# Patient Record
Sex: FEMALE | Race: WHITE | Employment: FULL TIME | URBAN - METROPOLITAN AREA
[De-identification: names, ages, dates, MRNs, and addresses within clinical notes are randomized per-mention and may not be internally consistent; named-entity substitution may affect disease eponyms.]

---

## 2018-09-24 ENCOUNTER — HOSPITAL ENCOUNTER (OUTPATIENT)
Dept: GENERAL RADIOLOGY | Age: 68
Discharge: HOME | End: 2018-09-26
Payer: MEDICARE

## 2018-09-24 DIAGNOSIS — Z12.31 ENCOUNTER FOR SCREENING MAMMOGRAM FOR HIGH-RISK PATIENT: ICD-10-CM

## 2018-09-24 PROCEDURE — 77063 BREAST TOMOSYNTHESIS BI: CPT

## 2019-03-05 ENCOUNTER — HOSPITAL ENCOUNTER (OUTPATIENT)
Dept: GENERAL RADIOLOGY | Age: 69
Discharge: HOME | End: 2019-03-07
Payer: MEDICARE

## 2019-03-05 ENCOUNTER — HOSPITAL ENCOUNTER (OUTPATIENT)
Age: 69
Discharge: HOME | End: 2019-03-07
Payer: MEDICARE

## 2019-03-05 DIAGNOSIS — M25.551 RIGHT HIP PAIN: ICD-10-CM

## 2019-03-05 PROCEDURE — 72170 X-RAY EXAM OF PELVIS: CPT

## 2019-03-05 PROCEDURE — 73502 X-RAY EXAM HIP UNI 2-3 VIEWS: CPT

## 2019-05-03 ENCOUNTER — HOSPITAL ENCOUNTER (OUTPATIENT)
Age: 69
Discharge: HOME | End: 2019-05-05
Payer: MEDICARE

## 2019-05-03 ENCOUNTER — HOSPITAL ENCOUNTER (OUTPATIENT)
Dept: ULTRASOUND IMAGING | Age: 69
Discharge: HOME | End: 2019-05-05
Payer: MEDICARE

## 2019-05-03 DIAGNOSIS — R10.11 ABDOMINAL PAIN, RIGHT UPPER QUADRANT: ICD-10-CM

## 2019-05-03 PROCEDURE — 76700 US EXAM ABDOM COMPLETE: CPT

## 2019-11-11 ENCOUNTER — HOSPITAL ENCOUNTER (OUTPATIENT)
Dept: GENERAL RADIOLOGY | Age: 69
Discharge: HOME | End: 2019-11-13
Payer: MEDICARE

## 2019-11-11 DIAGNOSIS — Z12.31 VISIT FOR SCREENING MAMMOGRAM: ICD-10-CM

## 2019-11-11 PROCEDURE — 77063 BREAST TOMOSYNTHESIS BI: CPT

## 2019-12-02 ENCOUNTER — HOSPITAL ENCOUNTER (OUTPATIENT)
Age: 69
Discharge: HOME | End: 2019-12-04
Payer: MEDICARE

## 2019-12-02 ENCOUNTER — HOSPITAL ENCOUNTER (OUTPATIENT)
Dept: GENERAL RADIOLOGY | Age: 69
Discharge: HOME | End: 2019-12-04
Payer: MEDICARE

## 2019-12-02 DIAGNOSIS — R07.89 OTHER CHEST PAIN: ICD-10-CM

## 2019-12-02 PROCEDURE — 71101 X-RAY EXAM UNILAT RIBS/CHEST: CPT

## 2020-12-07 ENCOUNTER — HOSPITAL ENCOUNTER (OUTPATIENT)
Dept: GENERAL RADIOLOGY | Age: 70
Discharge: HOME | End: 2020-12-09
Payer: MEDICARE

## 2020-12-07 PROCEDURE — 77063 BREAST TOMOSYNTHESIS BI: CPT

## 2021-03-22 ENCOUNTER — OFFICE VISIT (OUTPATIENT)
Dept: PODIATRY | Age: 71
End: 2021-03-22
Payer: MEDICARE

## 2021-03-22 VITALS — HEIGHT: 62 IN | WEIGHT: 121 LBS | BODY MASS INDEX: 22.26 KG/M2

## 2021-03-22 DIAGNOSIS — B35.1 ONYCHOMYCOSIS: ICD-10-CM

## 2021-03-22 DIAGNOSIS — G57.91 NERVE ENTRAPMENT SYNDROME OF RIGHT FOOT: ICD-10-CM

## 2021-03-22 DIAGNOSIS — M79.671 PAIN OF RIGHT FOOT: ICD-10-CM

## 2021-03-22 DIAGNOSIS — M89.8X7 EXOSTOSIS OF RIGHT FOOT: Primary | ICD-10-CM

## 2021-03-22 DIAGNOSIS — M79.671 RIGHT FOOT PAIN: Primary | ICD-10-CM

## 2021-03-22 DIAGNOSIS — R26.2 DIFFICULTY WALKING: ICD-10-CM

## 2021-03-22 PROCEDURE — 4004F PT TOBACCO SCREEN RCVD TLK: CPT | Performed by: PODIATRIST

## 2021-03-22 PROCEDURE — 4040F PNEUMOC VAC/ADMIN/RCVD: CPT | Performed by: PODIATRIST

## 2021-03-22 PROCEDURE — 1123F ACP DISCUSS/DSCN MKR DOCD: CPT | Performed by: PODIATRIST

## 2021-03-22 PROCEDURE — G8484 FLU IMMUNIZE NO ADMIN: HCPCS | Performed by: PODIATRIST

## 2021-03-22 PROCEDURE — G8427 DOCREV CUR MEDS BY ELIG CLIN: HCPCS | Performed by: PODIATRIST

## 2021-03-22 PROCEDURE — 3017F COLORECTAL CA SCREEN DOC REV: CPT | Performed by: PODIATRIST

## 2021-03-22 PROCEDURE — G8400 PT W/DXA NO RESULTS DOC: HCPCS | Performed by: PODIATRIST

## 2021-03-22 PROCEDURE — 1090F PRES/ABSN URINE INCON ASSESS: CPT | Performed by: PODIATRIST

## 2021-03-22 PROCEDURE — 99203 OFFICE O/P NEW LOW 30 MIN: CPT | Performed by: PODIATRIST

## 2021-03-22 PROCEDURE — G8420 CALC BMI NORM PARAMETERS: HCPCS | Performed by: PODIATRIST

## 2021-03-22 RX ORDER — HYDROCHLOROTHIAZIDE 25 MG/1
TABLET ORAL
COMMUNITY
Start: 2020-12-21

## 2021-03-22 RX ORDER — LISINOPRIL 20 MG/1
TABLET ORAL
COMMUNITY
Start: 2021-01-18

## 2021-03-22 RX ORDER — CELECOXIB 200 MG/1
200 CAPSULE ORAL 2 TIMES DAILY
COMMUNITY

## 2021-03-22 NOTE — PROGRESS NOTES
Patient is here as a new patient and would like to get established. Patient is here for arthritis in the right foot. Left foot callous. Patient has think bilateral nails and would like to filed down. Patient would like to know about cortisone injections.

## 2021-03-22 NOTE — PROGRESS NOTES
tablet, Take 7.5 mg by mouth 2 times daily. , Disp: , Rfl:     Allergies: Allergies   Allergen Reactions    Fish-Derived Products     Indomethacin        Vitals:    03/22/21 0918   Weight: 121 lb (54.9 kg)   Height: 5' 2\" (1.575 m)        Past Medical History:   Diagnosis Date    Cancer     breast right     No family history on file. Past Surgical History:   Procedure Laterality Date    LYMPHADENECTOMY      right    MASTECTOMY, PARTIAL       Social History     Tobacco Use    Smoking status: Never Smoker   Substance Use Topics    Alcohol use: No    Drug use: No           Diagnostic studies:    Xr Foot Right (min 3 Views)    Result Date: 3/22/2021  EXAMINATION: THREE XRAY VIEWS OF THE RIGHT FOOT 3/22/2021 8:29 am COMPARISON: None. HISTORY: ORDERING SYSTEM PROVIDED HISTORY: Right foot pain TECHNOLOGIST PROVIDED HISTORY: Reason for exam:->right foot pain FINDINGS: There is marked loss/flattening of the mid foot arch. There is marked joint space narrowing, degenerative remodeling, and osteophyte formation of the the intertarsal, 2nd through 5th MTT, and 1st MTP joints. An enthesophyte arises at the Achilles insertion. A spur projects from the plantar calcaneus. No acute abnormalities. Heel spur and polyarticular osteoarthritis. Procedures:    None    Exam:  VASCULAR: Pedal pulses palpable right foot. Capillary fill time less than 5 seconds digits 1 through 5 right foot. NEUROLOGICAL: Epicritic sensations intact right foot. Paresthesias noted upon percussion along the deep peroneal nerve right midfoot region. DERMATOLOGICAL: Mild edematous issues noted into the dorsal right midfoot region without ecchymotic skin changes present. No skin abrasions or any signs of infection noted right foot. Dystrophy was also noted to multiple digital nails bilateral foot.   MUSCULOSKELETAL: Tenderness noted with active range of motion right ankle and subtalar joint along the dorsal extensor tendon complex. Plan Per Assessment  Lizbeth Paredes was seen today for foot pain and callouses. Diagnoses and all orders for this visit:    Exostosis of right foot    Nerve entrapment syndrome of right foot    Onychomycosis  -     ciclopirox (PENLAC) 8 % solution; Apply topically daily to affected nails. Pain of right foot    Difficulty walking        1. New patient evaluation and management  2. We did review x-ray studies which were ordered today with patient right foot. No acute abnormalities noted. Significant osteoarthritic changes noted dorsal naviculocuneiform region. Prescription medication Penlac given with exact instructions on usage. I discussed the potential side effects that the patient may experience with the medication and the need to call if they experience any. We did discuss continued conservative care options regarding the right foot issues as well as potential surgical intervention. Patient will be followed up at a later date for continued evaluation and management. She was advised to call the office with any questions or concerns in the interim. Seen By:    Edilberto Wing DPM    Electronically signed by Edilberto Wing DPM on 3/22/2021 at 4:42 PM      This note was created using voice recognition software. The note was reviewed however may contain grammatical errors.

## 2021-11-22 ENCOUNTER — HOSPITAL ENCOUNTER (OUTPATIENT)
Dept: GENERAL RADIOLOGY | Age: 71
Discharge: HOME | End: 2021-11-24
Payer: MEDICARE

## 2021-11-22 ENCOUNTER — HOSPITAL ENCOUNTER (OUTPATIENT)
Age: 71
Discharge: HOME | End: 2021-11-24
Payer: MEDICARE

## 2021-11-22 DIAGNOSIS — M25.512 ACUTE PAIN OF LEFT SHOULDER: ICD-10-CM

## 2021-11-22 PROCEDURE — 73030 X-RAY EXAM OF SHOULDER: CPT

## 2022-07-25 ENCOUNTER — OFFICE VISIT (OUTPATIENT)
Dept: PODIATRY | Age: 72
End: 2022-07-25
Payer: MEDICARE

## 2022-07-25 VITALS — BODY MASS INDEX: 22.26 KG/M2 | WEIGHT: 121 LBS | HEIGHT: 62 IN

## 2022-07-25 DIAGNOSIS — B35.1 ONYCHOMYCOSIS: Primary | ICD-10-CM

## 2022-07-25 DIAGNOSIS — L60.0 OC (ONYCHOCRYPTOSIS): ICD-10-CM

## 2022-07-25 DIAGNOSIS — L85.3 XEROSIS CUTIS: ICD-10-CM

## 2022-07-25 PROCEDURE — G8420 CALC BMI NORM PARAMETERS: HCPCS | Performed by: PODIATRIST

## 2022-07-25 PROCEDURE — G8400 PT W/DXA NO RESULTS DOC: HCPCS | Performed by: PODIATRIST

## 2022-07-25 PROCEDURE — G8427 DOCREV CUR MEDS BY ELIG CLIN: HCPCS | Performed by: PODIATRIST

## 2022-07-25 PROCEDURE — 99213 OFFICE O/P EST LOW 20 MIN: CPT | Performed by: PODIATRIST

## 2022-07-25 PROCEDURE — 1123F ACP DISCUSS/DSCN MKR DOCD: CPT | Performed by: PODIATRIST

## 2022-07-25 PROCEDURE — 4004F PT TOBACCO SCREEN RCVD TLK: CPT | Performed by: PODIATRIST

## 2022-07-25 PROCEDURE — 1090F PRES/ABSN URINE INCON ASSESS: CPT | Performed by: PODIATRIST

## 2022-07-25 PROCEDURE — 3017F COLORECTAL CA SCREEN DOC REV: CPT | Performed by: PODIATRIST

## 2022-07-25 RX ORDER — AMMONIUM LACTATE 12 G/100G
CREAM TOPICAL
Qty: 385 G | Refills: 4 | Status: SHIPPED | OUTPATIENT
Start: 2022-07-25

## 2022-07-25 NOTE — PROGRESS NOTES
22     Debby Velazquez    : 1950   Sex: female    Age: 70 y.o. Patient's PCP/Provider is:  Dee Duran DO    Subjective:  Patient is seen today for follow-up regarding continued evaluation regarding nail dystrophy issues ingrown issues multiple nails bilateral foot. Also having issues with chronic xerotic skin changes to the plantar forefoot regions bilaterally. No other additional abnormalities noted at this time. Chief Complaint   Patient presents with    Nail Problem       ROS:  Const: Positives and pertinent negatives as per HPI. Musculo: Denies symptoms other than stated above. Neuro: Denies symptoms other than stated above. Skin: Denies symptoms other than stated above. Current Medications:    Current Outpatient Medications:     ciclopirox (PENLAC) 8 % solution, Apply topically daily to affected nails. , Disp: 6 mL, Rfl: 2    ammonium lactate (LAC-HYDRIN) 12 % cream, Apply topically as needed. , Disp: 385 g, Rfl: 4    hydroCHLOROthiazide (HYDRODIURIL) 25 MG tablet, TAKE 1 TABLET BY MOUTH EVERY DAY, Disp: , Rfl:     lisinopril (PRINIVIL;ZESTRIL) 20 MG tablet, TAKE 1 TABLET BY MOUTH EVERY DAY, Disp: , Rfl:     celecoxib (CELEBREX) 200 MG capsule, Take 200 mg by mouth 2 times daily, Disp: , Rfl:     meloxicam (MOBIC) 7.5 MG tablet, Take 7.5 mg by mouth 2 times daily. , Disp: , Rfl:     Cholecalciferol 100 MCG (4000 UT) CAPS, Take  by mouth daily. , Disp: , Rfl:     Magnesium Chloride (MAG-SR PO), Take 1,350 mg by mouth daily. , Disp: , Rfl:     verapamil (CALAN SR) 180 MG extended release tablet, Take 90 mg by mouth nightly., Disp: , Rfl:     Allergies: Allergies   Allergen Reactions    Fish-Derived Products     Indomethacin        Vitals:    22 1543   Weight: 121 lb (54.9 kg)   Height: 5' 2\" (1.575 m)       Exam:  Neurovascular status unchanged. Ingrown and nail dystrophy changes noted multiple digital nails bilateral foot.   No signs of bacterial infection noted bilateral foot.  No maceration webspaces noted bilateral foot. Xerotic skin changes noted into the plantar forefoot regions bilaterally. Diagnostic Studies:     No results found. Procedures:    None    Plan Per Assessment  Lorraine Peña was seen today for nail problem. Diagnoses and all orders for this visit:    Onychomycosis  -     ciclopirox (PENLAC) 8 % solution; Apply topically daily to affected nails. OC (onychocryptosis)    Xerosis cutis  -     ammonium lactate (LAC-HYDRIN) 12 % cream; Apply topically as needed. Evaluation and management  Prescriptions were given for both topical Penlac solution and Lac-Hydrin lotion to be utilized as instructed to affected regions. We did recommend continued use of her good supportive shoe gear with every day activities to prevent worsening of issues. Patient will be followed up at a later date for continued evaluation and management. Seen By:    Fátima Black DPM    Electronically signed by Fátima Black DPM on 7/25/2022 at 4:06 PM    This note was created using voice recognition software. The note was reviewed however may contain grammatical errors. Speaking Coherently

## 2022-07-25 NOTE — PROGRESS NOTES
Patient is here for thick nail. Patient states right great toe had drainage.  Cris Estrada DO  Electronically signed by Naldo Lane LPN on 8/36/6777 at 9:57 PM

## 2022-10-03 ENCOUNTER — OFFICE VISIT (OUTPATIENT)
Dept: PODIATRY | Age: 72
End: 2022-10-03
Payer: MEDICARE

## 2022-10-03 VITALS — BODY MASS INDEX: 23.92 KG/M2 | WEIGHT: 130 LBS | HEIGHT: 62 IN

## 2022-10-03 DIAGNOSIS — M79.675 PAIN OF TOE OF LEFT FOOT: ICD-10-CM

## 2022-10-03 DIAGNOSIS — B35.1 ONYCHOMYCOSIS: Primary | ICD-10-CM

## 2022-10-03 DIAGNOSIS — I87.2 VENOUS INSUFFICIENCY (CHRONIC) (PERIPHERAL): ICD-10-CM

## 2022-10-03 DIAGNOSIS — M79.674 PAIN OF TOE OF RIGHT FOOT: ICD-10-CM

## 2022-10-03 PROCEDURE — 1090F PRES/ABSN URINE INCON ASSESS: CPT | Performed by: PODIATRIST

## 2022-10-03 PROCEDURE — G8400 PT W/DXA NO RESULTS DOC: HCPCS | Performed by: PODIATRIST

## 2022-10-03 PROCEDURE — 1036F TOBACCO NON-USER: CPT | Performed by: PODIATRIST

## 2022-10-03 PROCEDURE — G8420 CALC BMI NORM PARAMETERS: HCPCS | Performed by: PODIATRIST

## 2022-10-03 PROCEDURE — G8484 FLU IMMUNIZE NO ADMIN: HCPCS | Performed by: PODIATRIST

## 2022-10-03 PROCEDURE — G8427 DOCREV CUR MEDS BY ELIG CLIN: HCPCS | Performed by: PODIATRIST

## 2022-10-03 PROCEDURE — 1123F ACP DISCUSS/DSCN MKR DOCD: CPT | Performed by: PODIATRIST

## 2022-10-03 PROCEDURE — 3017F COLORECTAL CA SCREEN DOC REV: CPT | Performed by: PODIATRIST

## 2022-10-03 PROCEDURE — 99213 OFFICE O/P EST LOW 20 MIN: CPT | Performed by: PODIATRIST

## 2022-10-03 NOTE — PROGRESS NOTES
10/3/22     Shin Hawkins    : 1950   Sex: female    Age: 70 y.o. Patient's PCP/Provider is:  Milvia Dumont DO    Subjective:  Patient is seen today for follow-up regarding continued evaluation regarding mycotic nail issues and dystrophy issues to both feet. Patient stated issues have been improving with use of the topical medications prescribed. Is wearing appropriate shoe gear to prevent any excessive irritation to the digital regions bilaterally. No other additional abnormalities noted. Chief Complaint   Patient presents with    Nail Problem     Nail fungus        ROS:  Const: Positives and pertinent negatives as per HPI. Musculo: Denies symptoms other than stated above. Neuro: Denies symptoms other than stated above. Skin: Denies symptoms other than stated above. Current Medications:    Current Outpatient Medications:     ciclopirox (PENLAC) 8 % solution, Apply topically daily to affected nails. , Disp: 6 mL, Rfl: 2    ammonium lactate (LAC-HYDRIN) 12 % cream, Apply topically as needed. , Disp: 385 g, Rfl: 4    hydroCHLOROthiazide (HYDRODIURIL) 25 MG tablet, TAKE 1 TABLET BY MOUTH EVERY DAY, Disp: , Rfl:     lisinopril (PRINIVIL;ZESTRIL) 20 MG tablet, TAKE 1 TABLET BY MOUTH EVERY DAY, Disp: , Rfl:     celecoxib (CELEBREX) 200 MG capsule, Take 200 mg by mouth 2 times daily, Disp: , Rfl:     meloxicam (MOBIC) 7.5 MG tablet, Take 7.5 mg by mouth 2 times daily. , Disp: , Rfl:     Cholecalciferol 100 MCG (4000 UT) CAPS, Take  by mouth daily. , Disp: , Rfl:     Magnesium Chloride (MAG-SR PO), Take 1,350 mg by mouth daily. , Disp: , Rfl:     verapamil (CALAN SR) 180 MG extended release tablet, Take 90 mg by mouth nightly., Disp: , Rfl:     Allergies: Allergies   Allergen Reactions    Fish-Derived Products     Indomethacin        Vitals:    10/03/22 0805   Weight: 130 lb (59 kg)   Height: 5' 2\" (1.575 m)       Exam:  Neurovascular status unchanged.   Mycotic nail issues stable to both lower extremities. Maceration webspaces noted bilateral foot. No plantar calluses or skin fissuring noted bilateral foot. Diagnostic Studies:     No results found. Procedures:    None    Plan Per Assessment  Issac García was seen today for nail problem. Diagnoses and all orders for this visit:    Onychomycosis    Pain of toe of right foot    Pain of toe of left foot    Venous insufficiency (chronic) (peripheral)      Evaluation and management  We did discuss continued use of the topical treatments as previously discussed. Patient is to continue with her good supportive shoe gear with every day activities. Patient will be followed up at a later date for continued evaluation and management. Seen By:    Matthieu Bruno DPM    Electronically signed by Matthieu Bruno DPM on 10/3/2022 at 8:20 AM    This note was created using voice recognition software. The note was reviewed however may contain grammatical errors.

## 2022-12-05 ENCOUNTER — OFFICE VISIT (OUTPATIENT)
Dept: PODIATRY | Age: 72
End: 2022-12-05
Payer: MEDICARE

## 2022-12-05 VITALS — BODY MASS INDEX: 23.92 KG/M2 | HEIGHT: 62 IN | WEIGHT: 130 LBS

## 2022-12-05 DIAGNOSIS — M79.675 PAIN OF TOE OF LEFT FOOT: ICD-10-CM

## 2022-12-05 DIAGNOSIS — I87.2 VENOUS INSUFFICIENCY (CHRONIC) (PERIPHERAL): ICD-10-CM

## 2022-12-05 DIAGNOSIS — M79.674 PAIN OF TOE OF RIGHT FOOT: ICD-10-CM

## 2022-12-05 DIAGNOSIS — L85.3 XEROSIS CUTIS: ICD-10-CM

## 2022-12-05 DIAGNOSIS — B35.1 ONYCHOMYCOSIS: Primary | ICD-10-CM

## 2022-12-05 PROCEDURE — 99999 PR OFFICE/OUTPT VISIT,PROCEDURE ONLY: CPT | Performed by: PODIATRIST

## 2022-12-05 PROCEDURE — 11721 DEBRIDE NAIL 6 OR MORE: CPT | Performed by: PODIATRIST

## 2022-12-05 RX ORDER — TRIAMCINOLONE ACETONIDE 1 MG/G
CREAM TOPICAL
Qty: 90 G | Refills: 2 | Status: SHIPPED | OUTPATIENT
Start: 2022-12-05

## 2022-12-05 NOTE — PROGRESS NOTES
22     Chon Christopher    : 1950  Sex: female  Age: 67 y.o. Subjective: The patient is seen today for evaluation regarding foot evaluation and mycotic nail care. No other complaints noted. Chief Complaint   Patient presents with    Nail Problem     Nail fungus        Current Medications:    Current Outpatient Medications:     triamcinolone (KENALOG) 0.1 % cream, Apply topically 2 times daily. , Disp: 90 g, Rfl: 2    ciclopirox (PENLAC) 8 % solution, Apply topically daily to affected nails. , Disp: 6 mL, Rfl: 2    ammonium lactate (LAC-HYDRIN) 12 % cream, Apply topically as needed. , Disp: 385 g, Rfl: 4    hydroCHLOROthiazide (HYDRODIURIL) 25 MG tablet, TAKE 1 TABLET BY MOUTH EVERY DAY, Disp: , Rfl:     lisinopril (PRINIVIL;ZESTRIL) 20 MG tablet, TAKE 1 TABLET BY MOUTH EVERY DAY, Disp: , Rfl:     celecoxib (CELEBREX) 200 MG capsule, Take 200 mg by mouth 2 times daily, Disp: , Rfl:     meloxicam (MOBIC) 7.5 MG tablet, Take 7.5 mg by mouth 2 times daily. , Disp: , Rfl:     Cholecalciferol 100 MCG (4000 UT) CAPS, Take  by mouth daily. , Disp: , Rfl:     Magnesium Chloride (MAG-SR PO), Take 1,350 mg by mouth daily. , Disp: , Rfl:     verapamil (CALAN SR) 180 MG extended release tablet, Take 90 mg by mouth nightly., Disp: , Rfl:     Allergies: Allergies   Allergen Reactions    Fish-Derived Products     Indomethacin        Past Surgical History:   Procedure Laterality Date    LYMPHADENECTOMY      right    MASTECTOMY, PARTIAL       Past Medical History:   Diagnosis Date    Cancer ([facility] [address]     breast right       Vitals:    22 0754   Weight: 130 lb (59 kg)   Height: 5' 2\" (1.575 m)       Exam:  Pedal pulses diminished to palpation bilateral foot. At this time the nail/s 1, 2, 5 right foot and nail/s 1, 2, 5 left foot are noted to be thickened, dystrophic and discolored with subungual debris present. Tenderness noted to palpation. Diminished hair growth is noted to both lower extremities.   Edema noted with both varicosities and stasis skin changes present bilaterally. Coolness is noted to the digital regions to palpation. Capillary fill time delayed digital areas bilateral foot. No heel fissuring or macerations of the web spaces. No plantar calluses and/or ulcerative areas are noted. Patient is having difficulty with gait/walking. Plan Per Assessment  Karie Arango was seen today for nail problem. Diagnoses and all orders for this visit:    Onychomycosis    Pain of toe of right foot    Pain of toe of left foot    Xerosis cutis  -     triamcinolone (KENALOG) 0.1 % cream; Apply topically 2 times daily. Venous insufficiency (chronic) (peripheral)  -     triamcinolone (KENALOG) 0.1 % cream; Apply topically 2 times daily. 1. Evaluation and Management  2. Manual and electrical debridement of the mycotic nails was performed for thickness and length to prevent injection and/or ulceration. 3. Discussed additional venous insufficiency lower extremity care techniques with patient today. 4. It was discussed in detail with the patient proper caring for the vascular compromised foot. The fact that they have compromised blood flow put the patient at risk for infection/gangrene/amputation. The patient should not walk barefoot. Shoe gear should fit properly and socks should be worn with shoes. If any skin lesions are noted, they are instructed to contact the office immediately. 5. We will see the patient back at a later date for continued podiatric management and care. Patient was advised to call the office with any questions or concerns prior to their next appointment if needed. Seen By:    Annette Chpaman DPM    Electronically signed by Annette Chapman DPM on 12/5/2022 at 8:15 AM      This note was created using voice recognition software. The note was reviewed however may contain grammatical errors.

## 2022-12-05 NOTE — PROGRESS NOTES
Patient is in today for 2 month routine nail fungus. Patient says she also is having issues under both feet.  Pcp is Patience Guerra,   Last ov 7/12/22

## 2023-02-06 ENCOUNTER — OFFICE VISIT (OUTPATIENT)
Dept: PODIATRY | Age: 73
End: 2023-02-06
Payer: MEDICARE

## 2023-02-06 VITALS — BODY MASS INDEX: 23.92 KG/M2 | WEIGHT: 130 LBS | HEIGHT: 62 IN

## 2023-02-06 DIAGNOSIS — I87.2 VENOUS INSUFFICIENCY (CHRONIC) (PERIPHERAL): ICD-10-CM

## 2023-02-06 DIAGNOSIS — B35.1 ONYCHOMYCOSIS: Primary | ICD-10-CM

## 2023-02-06 DIAGNOSIS — M79.674 PAIN OF TOE OF RIGHT FOOT: ICD-10-CM

## 2023-02-06 DIAGNOSIS — R26.2 DIFFICULTY WALKING: ICD-10-CM

## 2023-02-06 DIAGNOSIS — M79.675 PAIN OF TOE OF LEFT FOOT: ICD-10-CM

## 2023-02-06 PROCEDURE — 11721 DEBRIDE NAIL 6 OR MORE: CPT | Performed by: PODIATRIST

## 2023-02-06 PROCEDURE — 99999 PR OFFICE/OUTPT VISIT,PROCEDURE ONLY: CPT | Performed by: PODIATRIST

## 2023-02-06 NOTE — PROGRESS NOTES
23     Priscilla Hicks    : 1950  Sex: female  Age: 67 y.o. Subjective: The patient is seen today for evaluation regarding foot evaluation and mycotic nail care. No other complaints noted. Chief Complaint   Patient presents with    Nail Problem     Nail fungus        Current Medications:    Current Outpatient Medications:     triamcinolone (KENALOG) 0.1 % cream, Apply topically 2 times daily. , Disp: 90 g, Rfl: 2    ciclopirox (PENLAC) 8 % solution, Apply topically daily to affected nails. , Disp: 6 mL, Rfl: 2    ammonium lactate (LAC-HYDRIN) 12 % cream, Apply topically as needed. , Disp: 385 g, Rfl: 4    hydroCHLOROthiazide (HYDRODIURIL) 25 MG tablet, TAKE 1 TABLET BY MOUTH EVERY DAY, Disp: , Rfl:     lisinopril (PRINIVIL;ZESTRIL) 20 MG tablet, TAKE 1 TABLET BY MOUTH EVERY DAY, Disp: , Rfl:     celecoxib (CELEBREX) 200 MG capsule, Take 200 mg by mouth 2 times daily, Disp: , Rfl:     meloxicam (MOBIC) 7.5 MG tablet, Take 7.5 mg by mouth 2 times daily. , Disp: , Rfl:     Cholecalciferol 100 MCG (4000 UT) CAPS, Take  by mouth daily. , Disp: , Rfl:     Magnesium Chloride (MAG-SR PO), Take 1,350 mg by mouth daily. , Disp: , Rfl:     verapamil (CALAN SR) 180 MG extended release tablet, Take 90 mg by mouth nightly., Disp: , Rfl:     Allergies: Allergies   Allergen Reactions    Fish-Derived Products     Indomethacin        Past Surgical History:   Procedure Laterality Date    LYMPHADENECTOMY      right    MASTECTOMY, PARTIAL       Past Medical History:   Diagnosis Date    Cancer ([facility] [address]     breast right       Vitals:    23 0804   Weight: 130 lb (59 kg)   Height: 5' 2\" (1.575 m)       Exam:  Pedal pulses diminished to palpation bilateral foot. At this time the nail/s 1, 2, 5 right foot and nail/s 1, 2, 5 left foot are noted to be thickened, dystrophic and discolored with subungual debris present. Tenderness noted to palpation. Minimal hair growth is noted to both lower extremities.   Edema noted with both varicosities and stasis skin changes present bilaterally. Coolness is noted to the digital regions to palpation. Capillary fill time delayed digital areas bilateral foot. No heel fissuring or macerations of the web spaces. No plantar calluses and/or ulcerative areas are noted. Patient is having difficulty with gait/walking. Plan Per Assessment  Soundra Phalen was seen today for nail problem. Diagnoses and all orders for this visit:    Onychomycosis    Pain of toe of right foot    Pain of toe of left foot    Venous insufficiency (chronic) (peripheral)    Difficulty walking        1. Evaluation and Management  2. Manual and electrical debridement of the mycotic nails was performed for thickness and length to prevent injection and/or ulceration. 3. Discussed additional venous insufficiency lower extremity care techniques with patient today. 4. It was discussed in detail with the patient proper caring for the vascular compromised foot. The fact that they have compromised blood flow put the patient at risk for infection/gangrene/amputation. The patient should not walk barefoot. Shoe gear should fit properly and socks should be worn with shoes. If any skin lesions are noted, they are instructed to contact the office immediately. 5. We will see the patient back at a later date for continued podiatric management and care. Patient was advised to call the office with any questions or concerns prior to their next appointment if needed. Seen By:    Neal Damian DPM    Electronically signed by Neal Damian DPM on 2/6/2023 at 8:23 AM      This note was created using voice recognition software. The note was reviewed however may contain grammatical errors.

## 2023-02-06 NOTE — PROGRESS NOTES
Patient in today for nail care. Patient does not have any complaints of pain at this time.  Patient's PCP is Lashell Flores,  date of last ov 9/13/22          Lavonda Dandy, LPN

## 2023-06-05 ENCOUNTER — HOSPITAL ENCOUNTER (EMERGENCY)
Age: 73
Discharge: HOME | End: 2023-06-05
Attending: EMERGENCY MEDICINE
Payer: COMMERCIAL

## 2023-06-05 VITALS
BODY MASS INDEX: 25.76 KG/M2 | WEIGHT: 140 LBS | RESPIRATION RATE: 18 BRPM | DIASTOLIC BLOOD PRESSURE: 72 MMHG | TEMPERATURE: 99.5 F | SYSTOLIC BLOOD PRESSURE: 140 MMHG | HEIGHT: 62 IN | OXYGEN SATURATION: 98 % | HEART RATE: 88 BPM

## 2023-06-05 DIAGNOSIS — R33.9 URINARY RETENTION: Primary | ICD-10-CM

## 2023-06-05 LAB
ALBUMIN SERPL-MCNC: 4.2 G/DL (ref 3.5–5.2)
ALP SERPL-CCNC: 94 U/L (ref 35–104)
ALT SERPL-CCNC: 24 U/L (ref 0–32)
ANION GAP SERPL CALCULATED.3IONS-SCNC: 15 MMOL/L (ref 7–16)
AST SERPL-CCNC: 31 U/L (ref 0–31)
BACTERIA URNS QL MICRO: ABNORMAL /HPF
BASOPHILS # BLD: 0.01 E9/L (ref 0–0.2)
BASOPHILS NFR BLD: 0.1 % (ref 0–2)
BILIRUB SERPL-MCNC: 0.5 MG/DL (ref 0–1.2)
BILIRUB UR QL STRIP: NEGATIVE
BUN SERPL-MCNC: 26 MG/DL (ref 6–23)
CALCIUM SERPL-MCNC: 9.2 MG/DL (ref 8.6–10.2)
CHLORIDE SERPL-SCNC: 104 MMOL/L (ref 98–107)
CLARITY UR: ABNORMAL
CO2 SERPL-SCNC: 22 MMOL/L (ref 22–29)
COLOR UR: YELLOW
CREAT SERPL-MCNC: 0.8 MG/DL (ref 0.5–1)
EOSINOPHIL # BLD: 0 E9/L (ref 0.05–0.5)
EOSINOPHIL NFR BLD: 0 % (ref 0–6)
EPI CELLS #/AREA URNS HPF: ABNORMAL /HPF
ERYTHROCYTE [DISTWIDTH] IN BLOOD BY AUTOMATED COUNT: 12 FL (ref 11.5–15)
GLUCOSE SERPL-MCNC: 185 MG/DL (ref 74–99)
GLUCOSE UR STRIP-MCNC: NEGATIVE MG/DL
HCT VFR BLD AUTO: 37.7 % (ref 34–48)
HGB BLD-MCNC: 13.3 G/DL (ref 11.5–15.5)
HGB UR QL STRIP: ABNORMAL
IMM GRANULOCYTES # BLD: 0.04 E9/L
IMM GRANULOCYTES NFR BLD: 0.3 % (ref 0–5)
KETONES UR STRIP-MCNC: ABNORMAL MG/DL
LEUKOCYTE ESTERASE UR QL STRIP: NEGATIVE
LYMPHOCYTES # BLD: 0.45 E9/L (ref 1.5–4)
LYMPHOCYTES NFR BLD: 3.8 % (ref 20–42)
MCH RBC QN AUTO: 33.9 PG (ref 26–35)
MCHC RBC AUTO-ENTMCNC: 35.3 % (ref 32–34.5)
MCV RBC AUTO: 96.2 FL (ref 80–99.9)
MONOCYTES # BLD: 0.29 E9/L (ref 0.1–0.95)
MONOCYTES NFR BLD: 2.4 % (ref 2–12)
NEUTROPHILS # BLD: 11.08 E9/L (ref 1.8–7.3)
NEUTS SEG NFR BLD: 93.4 % (ref 43–80)
NITRITE UR QL STRIP: NEGATIVE
OVALOCYTES: ABNORMAL
PH UR STRIP: 6 [PH] (ref 5–9)
PLATELET # BLD AUTO: 185 E9/L (ref 130–450)
PMV BLD AUTO: 10 FL (ref 7–12)
POIKILOCYTES: ABNORMAL
POTASSIUM SERPL-SCNC: 4 MMOL/L (ref 3.5–5)
PROT SERPL-MCNC: 6.5 G/DL (ref 6.4–8.3)
PROT UR STRIP-MCNC: ABNORMAL MG/DL
RBC # BLD AUTO: 3.92 E12/L (ref 3.5–5.5)
RBC #/AREA URNS HPF: >20 /HPF (ref 0–2)
SODIUM SERPL-SCNC: 141 MMOL/L (ref 132–146)
SP GR UR STRIP: 1.02 (ref 1–1.03)
TEAR DROP CELLS: ABNORMAL
UROBILINOGEN UR STRIP-ACNC: 0.2 E.U./DL
WBC # BLD: 11.9 E9/L (ref 4.5–11.5)
WBC #/AREA URNS HPF: ABNORMAL /HPF (ref 0–5)

## 2023-06-05 PROCEDURE — 99283 EMERGENCY DEPT VISIT LOW MDM: CPT

## 2023-06-05 PROCEDURE — 36415 COLL VENOUS BLD VENIPUNCTURE: CPT

## 2023-06-05 PROCEDURE — 85025 COMPLETE CBC W/AUTO DIFF WBC: CPT

## 2023-06-05 PROCEDURE — 6370000000 HC RX 637 (ALT 250 FOR IP): Performed by: STUDENT IN AN ORGANIZED HEALTH CARE EDUCATION/TRAINING PROGRAM

## 2023-06-05 PROCEDURE — 51702 INSERT TEMP BLADDER CATH: CPT

## 2023-06-05 PROCEDURE — 81001 URINALYSIS AUTO W/SCOPE: CPT

## 2023-06-05 PROCEDURE — 80053 COMPREHEN METABOLIC PANEL: CPT

## 2023-06-05 RX ORDER — HYDROCODONE BITARTRATE AND ACETAMINOPHEN 5; 325 MG/1; MG/1
1 TABLET ORAL ONCE
Status: COMPLETED | OUTPATIENT
Start: 2023-06-05 | End: 2023-06-05

## 2023-06-05 RX ADMIN — HYDROCODONE BITARTRATE AND ACETAMINOPHEN 1 TABLET: 5; 325 TABLET ORAL at 22:44

## 2023-06-05 ASSESSMENT — PAIN DESCRIPTION - LOCATION: LOCATION: ABDOMEN;BACK

## 2023-06-05 ASSESSMENT — PAIN - FUNCTIONAL ASSESSMENT: PAIN_FUNCTIONAL_ASSESSMENT: 0-10

## 2023-06-05 ASSESSMENT — PAIN SCALES - GENERAL: PAINLEVEL_OUTOF10: 7

## 2023-06-05 ASSESSMENT — PAIN DESCRIPTION - ORIENTATION: ORIENTATION: LOWER

## 2023-06-05 NOTE — ED TRIAGE NOTES
Department of Emergency Medicine    FIRST PROVIDER TRIAGE NOTE             Independent MLP           6/5/23  6:46 PM EDT    Date of Encounter: 6/5/23   MRN: [id number]    There were no vitals filed for this visit. HPI: Eliz Hadley is a 67 y.o. female who presents to the ED for Urinary Retention (Had rotator cuff surgery today and unable to void x 8 hrs , has prolapsed bladder and unable to sit )    Has not urinated x8-9 hours     Ortho: Dr. Edgar Campos     ROS: Negative for cp or sob. Physical Exam:   Gen Appearance/Constitutional: alert  CV: regular rate     Initial Plan of Care: All treatment areas with department are currently occupied. Plan to order/Initiate the following while awaiting opening in ED: triage evaluation.     Initial Plan of Care: Initiate Treatment-Testing, Proceed toTreatment Area When Bed Available for ED Attending/MLP to Continue Care    Electronically signed by Zhanna Watt PA-C   DD: 6/5/23

## 2023-06-06 ASSESSMENT — ENCOUNTER SYMPTOMS
SHORTNESS OF BREATH: 0
DIARRHEA: 0
COUGH: 0
VOMITING: 0
BACK PAIN: 0
ABDOMINAL PAIN: 1
NAUSEA: 0

## 2023-06-06 NOTE — ED PROVIDER NOTES
providing specific details for the plan of care and counseling regarding the diagnosis and prognosis. Their questions are answered at this time and they are agreeable with the plan. I discussed at length with them reasons for immediate return here for re evaluation. They will followup with their  urology referral  by calling their office tomorrow. --------------------------------- ADDITIONAL PROVIDER NOTES ---------------------------------  At this time the patient is without objective evidence of an acute process requiring hospitalization or inpatient management. They have remained hemodynamically stable throughout their entire ED visit and are stable for discharge with outpatient follow-up. The plan has been discussed in detail and they are aware of the specific conditions for emergent return, as well as the importance of follow-up. Discharge Medication List as of 6/5/2023 11:11 PM          Diagnosis:  1. Urinary retention        Disposition:  Patient's disposition: Discharge to home  Patient's condition is stable. Sherlyn Mclean D.O. PGY-3     Resident Physician     Emergency Medicine      6/5/2023 9:48 PM      NOTE: This report was transcribed using voice recognition software.  Every effort was made to ensure accuracy; however, inadvertent computerized transcription errors may be present              Sherlyn Mclean DO  Resident  06/06/23 [id number]

## 2023-07-17 ENCOUNTER — HOSPITAL ENCOUNTER (OUTPATIENT)
Age: 73
Discharge: HOME | End: 2023-07-19
Payer: MEDICARE

## 2023-07-17 ENCOUNTER — HOSPITAL ENCOUNTER (OUTPATIENT)
Dept: GENERAL RADIOLOGY | Age: 73
Discharge: HOME | End: 2023-07-19
Payer: MEDICARE

## 2023-07-17 DIAGNOSIS — R06.00 DYSPNEA, UNSPECIFIED TYPE: ICD-10-CM

## 2023-07-17 PROCEDURE — 71046 X-RAY EXAM CHEST 2 VIEWS: CPT

## 2023-08-11 ENCOUNTER — OFFICE VISIT (OUTPATIENT)
Dept: PODIATRY | Age: 73
End: 2023-08-11

## 2023-08-11 VITALS — WEIGHT: 140 LBS | HEIGHT: 62 IN | BODY MASS INDEX: 25.76 KG/M2

## 2023-08-11 DIAGNOSIS — B35.1 ONYCHOMYCOSIS: Primary | ICD-10-CM

## 2023-08-11 DIAGNOSIS — I73.9 PVD (PERIPHERAL VASCULAR DISEASE) (HCC): ICD-10-CM

## 2023-08-11 DIAGNOSIS — M79.674 PAIN OF TOE OF RIGHT FOOT: ICD-10-CM

## 2023-08-11 DIAGNOSIS — M79.675 PAIN OF TOE OF LEFT FOOT: ICD-10-CM

## 2023-08-11 DIAGNOSIS — I87.2 VENOUS INSUFFICIENCY (CHRONIC) (PERIPHERAL): ICD-10-CM

## 2023-08-11 NOTE — PROGRESS NOTES
Patient in today for nail care. Patient does not have any complaints of pain at this time.  Patient's PCP is Estela Leonard DO date of last ov 8/3/23          Freya Foster LPN
with both varicosities and stasis skin changes present bilaterally. Coolness is noted to the digital regions to palpation. Capillary fill time delayed digital areas bilateral foot. No heel fissuring or macerations of the web spaces. No plantar calluses and/or ulcerative areas are noted. Patient is having difficulty with gait/walking. Plan Per Assessment  Fernanda Prather was seen today for nail problem. Diagnoses and all orders for this visit:    Onychomycosis    Pain of toe of right foot    Pain of toe of left foot    Venous insufficiency (chronic) (peripheral)    PVD (peripheral vascular disease) ([address])        1. Evaluation and Management  2. Manual and electrical debridement of the mycotic nails was performed for thickness and length to prevent injection and/or ulceration. 3. Discussed additional venous insufficiency lower extremity care techniques with patient today. 4. We did discuss importance of shoe gear and foot inspection to prevent potential issues. 5. We will see the patient back at a later date for continued podiatric management and care. Patient was advised to call the office with any questions or concerns prior to their next appointment if needed. Seen By:    Krystyna Hoff DPM    Electronically signed by Krystyna Hoff DPM on 8/11/2023 at 7:11 AM      This note was created using voice recognition software. The note was reviewed however may contain grammatical errors.

## 2023-10-16 ENCOUNTER — PROCEDURE VISIT (OUTPATIENT)
Dept: PODIATRY | Age: 73
End: 2023-10-16
Payer: MEDICARE

## 2023-10-16 VITALS — WEIGHT: 140 LBS | BODY MASS INDEX: 25.76 KG/M2 | HEIGHT: 62 IN

## 2023-10-16 DIAGNOSIS — M79.674 PAIN OF TOE OF RIGHT FOOT: ICD-10-CM

## 2023-10-16 DIAGNOSIS — M79.675 PAIN OF TOE OF LEFT FOOT: ICD-10-CM

## 2023-10-16 DIAGNOSIS — B35.1 ONYCHOMYCOSIS: Primary | ICD-10-CM

## 2023-10-16 DIAGNOSIS — I87.2 VENOUS INSUFFICIENCY (CHRONIC) (PERIPHERAL): ICD-10-CM

## 2023-10-16 DIAGNOSIS — I73.9 PVD (PERIPHERAL VASCULAR DISEASE) (HCC): ICD-10-CM

## 2023-10-16 DIAGNOSIS — R26.2 DIFFICULTY WALKING: ICD-10-CM

## 2023-10-16 PROCEDURE — 99999 PR OFFICE/OUTPT VISIT,PROCEDURE ONLY: CPT | Performed by: PODIATRIST

## 2023-10-16 PROCEDURE — 11721 DEBRIDE NAIL 6 OR MORE: CPT | Performed by: PODIATRIST

## 2023-10-16 NOTE — PROGRESS NOTES
10/16/23     Sean Knee    : 1950  Sex: female  Age: 67 y.o. Subjective: The patient is seen today for evaluation regarding foot evaluation and mycotic nail care. No other complaints noted. Chief Complaint   Patient presents with    Nail Problem     Nail care       Current Medications:    Current Outpatient Medications:     triamcinolone (KENALOG) 0.1 % cream, Apply topically 2 times daily. , Disp: 90 g, Rfl: 2    ciclopirox (PENLAC) 8 % solution, Apply topically daily to affected nails. , Disp: 6 mL, Rfl: 2    ammonium lactate (LAC-HYDRIN) 12 % cream, Apply topically as needed. , Disp: 385 g, Rfl: 4    hydroCHLOROthiazide (HYDRODIURIL) 25 MG tablet, TAKE 1 TABLET BY MOUTH EVERY DAY, Disp: , Rfl:     lisinopril (PRINIVIL;ZESTRIL) 20 MG tablet, TAKE 1 TABLET BY MOUTH EVERY DAY, Disp: , Rfl:     celecoxib (CELEBREX) 200 MG capsule, Take 1 capsule by mouth 2 times daily, Disp: , Rfl:     meloxicam (MOBIC) 7.5 MG tablet, Take 1 tablet by mouth 2 times daily, Disp: , Rfl:     Cholecalciferol 100 MCG (4000 UT) CAPS, Take  by mouth daily. , Disp: , Rfl:     Magnesium Chloride (MAG-SR PO), Take 1,350 mg by mouth daily. , Disp: , Rfl:     verapamil (CALAN SR) 180 MG extended release tablet, Take 0.5 tablets by mouth nightly, Disp: , Rfl:     Allergies: Allergies   Allergen Reactions    Fish-Derived Products     Indomethacin        Past Surgical History:   Procedure Laterality Date    LYMPHADENECTOMY      right    MASTECTOMY, PARTIAL       Past Medical History:   Diagnosis Date    Cancer ([address] [facility])     breast right       Vitals:    10/16/23 0702   Weight: 140 lb (63.5 kg)   Height: 5' 2\" (1.575 m)       Exam:  Pedal pulses diminished to palpation bilateral foot. At this time the nail/s 1, 2, 5 right foot and nail/s 1, 2, 5 left foot are noted to be thickened, dystrophic and discolored with subungual debris present. Tenderness noted to palpation. Diminished hair growth is noted to both lower extremities.   Edema

## 2023-10-16 NOTE — PROGRESS NOTES
Patient in today for nail care. Patient does not have any complaints of pain at this time.  Patient's PCP is Rikki Jurado, DO date of last ov 8/3/23          Haleigh Jay LPN

## 2023-12-11 ENCOUNTER — PROCEDURE VISIT (OUTPATIENT)
Dept: PODIATRY | Age: 73
End: 2023-12-11
Payer: MEDICARE

## 2023-12-11 VITALS — BODY MASS INDEX: 25.76 KG/M2 | WEIGHT: 140 LBS | HEIGHT: 62 IN

## 2023-12-11 DIAGNOSIS — M79.674 PAIN OF TOE OF RIGHT FOOT: ICD-10-CM

## 2023-12-11 DIAGNOSIS — R26.2 DIFFICULTY WALKING: ICD-10-CM

## 2023-12-11 DIAGNOSIS — M79.675 PAIN OF TOE OF LEFT FOOT: ICD-10-CM

## 2023-12-11 DIAGNOSIS — B35.1 ONYCHOMYCOSIS: Primary | ICD-10-CM

## 2023-12-11 DIAGNOSIS — I87.2 VENOUS INSUFFICIENCY (CHRONIC) (PERIPHERAL): ICD-10-CM

## 2023-12-11 DIAGNOSIS — L85.3 XEROSIS CUTIS: ICD-10-CM

## 2023-12-11 PROCEDURE — 99999 PR OFFICE/OUTPT VISIT,PROCEDURE ONLY: CPT | Performed by: PODIATRIST

## 2023-12-11 PROCEDURE — 11721 DEBRIDE NAIL 6 OR MORE: CPT | Performed by: PODIATRIST

## 2023-12-11 RX ORDER — TRIAMCINOLONE ACETONIDE 1 MG/G
CREAM TOPICAL
Qty: 90 G | Refills: 2 | Status: SHIPPED | OUTPATIENT
Start: 2023-12-11

## 2023-12-11 NOTE — PROGRESS NOTES
Patient in today for nail care. Patient says she is having sharp pains on the sides of her feet and has something on the right toe that is painful.   Patient's PCP is Alicia Jurado DO date of last ov 8/3/23          Nicolas Hernández LPN

## 2024-02-12 ENCOUNTER — PROCEDURE VISIT (OUTPATIENT)
Dept: PODIATRY | Age: 74
End: 2024-02-12
Payer: MEDICARE

## 2024-02-12 VITALS — WEIGHT: 140 LBS | BODY MASS INDEX: 25.76 KG/M2 | HEIGHT: 62 IN

## 2024-02-12 DIAGNOSIS — I87.2 VENOUS INSUFFICIENCY (CHRONIC) (PERIPHERAL): ICD-10-CM

## 2024-02-12 DIAGNOSIS — B35.1 ONYCHOMYCOSIS: Primary | ICD-10-CM

## 2024-02-12 DIAGNOSIS — L85.3 XEROSIS CUTIS: ICD-10-CM

## 2024-02-12 DIAGNOSIS — R26.2 DIFFICULTY WALKING: ICD-10-CM

## 2024-02-12 DIAGNOSIS — M79.675 PAIN OF TOE OF LEFT FOOT: ICD-10-CM

## 2024-02-12 DIAGNOSIS — M79.674 PAIN OF TOE OF RIGHT FOOT: ICD-10-CM

## 2024-02-12 PROCEDURE — 11721 DEBRIDE NAIL 6 OR MORE: CPT | Performed by: PODIATRIST

## 2024-02-12 PROCEDURE — 99999 PR OFFICE/OUTPT VISIT,PROCEDURE ONLY: CPT | Performed by: PODIATRIST

## 2024-02-12 NOTE — PROGRESS NOTES
24     Tamie Porter    : 1950  Sex: female  Age: 73 y.o.    Subjective:  The patient is seen today for evaluation regarding foot evaluation and mycotic nail care.  No other complaints noted.    Chief Complaint   Patient presents with    Nail Problem     Nail care       Current Medications:    Current Outpatient Medications:     clobetasol (TEMOVATE) 0.05 % ointment, Apply topically 2 times daily., Disp: 60 g, Rfl: 5    triamcinolone (KENALOG) 0.1 % cream, Apply topically 2 times daily., Disp: 90 g, Rfl: 2    ciclopirox (PENLAC) 8 % solution, Apply topically daily to affected nails., Disp: 6 mL, Rfl: 2    ammonium lactate (LAC-HYDRIN) 12 % cream, Apply topically as needed., Disp: 385 g, Rfl: 4    hydroCHLOROthiazide (HYDRODIURIL) 25 MG tablet, TAKE 1 TABLET BY MOUTH EVERY DAY, Disp: , Rfl:     lisinopril (PRINIVIL;ZESTRIL) 20 MG tablet, TAKE 1 TABLET BY MOUTH EVERY DAY, Disp: , Rfl:     celecoxib (CELEBREX) 200 MG capsule, Take 1 capsule by mouth 2 times daily, Disp: , Rfl:     meloxicam (MOBIC) 7.5 MG tablet, Take 1 tablet by mouth 2 times daily, Disp: , Rfl:     Cholecalciferol 100 MCG (4000 UT) CAPS, Take  by mouth daily., Disp: , Rfl:     Magnesium Chloride (MAG-SR PO), Take 1,350 mg by mouth daily., Disp: , Rfl:     verapamil (CALAN SR) 180 MG extended release tablet, Take 0.5 tablets by mouth nightly, Disp: , Rfl:     Allergies:  Allergies   Allergen Reactions    Fish-Derived Products     Indomethacin        Past Surgical History:   Procedure Laterality Date    LYMPHADENECTOMY      right    MASTECTOMY, PARTIAL       Past Medical History:   Diagnosis Date    Cancer (HCC)     breast right       Vitals:    24 0700   Weight: 63.5 kg (140 lb)   Height: 1.575 m (5' 2.01\")       Exam:  Pedal pulses diminished to palpation bilateral foot.  At this time the nail/s 1, 2, 5 right foot and nail/s 1, 2, 5 left foot are noted to be thickened, dystrophic and discolored with subungual debris present.

## 2024-02-12 NOTE — PROGRESS NOTES
Patient is in today for 2 month nail care. Patient says her toenails are still painful. Pcp is Jovanni Jurado DO  Last ov 8/3/23

## 2024-03-27 LAB
ALBUMIN SERPL-MCNC: 4.4 G/DL (ref 3.5–5.2)
ALP SERPL-CCNC: 84 U/L (ref 35–104)
ALT SERPL-CCNC: 35 U/L (ref 0–32)
ANION GAP SERPL CALCULATED.3IONS-SCNC: 15 MMOL/L (ref 7–16)
AST SERPL-CCNC: 45 U/L (ref 0–31)
BASOPHILS # BLD: 0.03 K/UL (ref 0–0.2)
BASOPHILS NFR BLD: 0 % (ref 0–2)
BILIRUB SERPL-MCNC: 0.6 MG/DL (ref 0–1.2)
BILIRUB UR QL STRIP: NEGATIVE
BUN SERPL-MCNC: 24 MG/DL (ref 6–23)
CALCIUM SERPL-MCNC: 9.8 MG/DL (ref 8.6–10.2)
CHLORIDE SERPL-SCNC: 102 MMOL/L (ref 98–107)
CHOLEST SERPL-MCNC: 170 MG/DL
CLARITY UR: CLEAR
CO2 SERPL-SCNC: 26 MMOL/L (ref 22–29)
COLOR UR: YELLOW
CREAT SERPL-MCNC: 0.6 MG/DL (ref 0.5–1)
EOSINOPHIL # BLD: 0 K/UL (ref 0.05–0.5)
EOSINOPHILS RELATIVE PERCENT: 0 % (ref 0–6)
ERYTHROCYTE [DISTWIDTH] IN BLOOD BY AUTOMATED COUNT: 12.4 % (ref 11.5–15)
GFR SERPL CREATININE-BSD FRML MDRD: >90 ML/MIN/1.73M2
GLUCOSE SERPL-MCNC: 77 MG/DL (ref 74–99)
GLUCOSE UR STRIP-MCNC: NEGATIVE MG/DL
HCT VFR BLD AUTO: 42.6 % (ref 34–48)
HDLC SERPL-MCNC: 69 MG/DL
HGB BLD-MCNC: 14.3 G/DL (ref 11.5–15.5)
HGB UR QL STRIP.AUTO: NEGATIVE
IMM GRANULOCYTES # BLD AUTO: 0.03 K/UL (ref 0–0.58)
IMM GRANULOCYTES NFR BLD: 0 % (ref 0–5)
KETONES UR STRIP-MCNC: NEGATIVE MG/DL
LDLC SERPL CALC-MCNC: 85 MG/DL
LEUKOCYTE ESTERASE UR QL STRIP: ABNORMAL
LYMPHOCYTES NFR BLD: 1.46 K/UL (ref 1.5–4)
LYMPHOCYTES RELATIVE PERCENT: 19 % (ref 20–42)
MCH RBC QN AUTO: 33.7 PG (ref 26–35)
MCHC RBC AUTO-ENTMCNC: 33.6 G/DL (ref 32–34.5)
MCV RBC AUTO: 100.5 FL (ref 80–99.9)
MONOCYTES NFR BLD: 0.55 K/UL (ref 0.1–0.95)
MONOCYTES NFR BLD: 7 % (ref 2–12)
NEUTROPHILS NFR BLD: 73 % (ref 43–80)
NEUTS SEG NFR BLD: 5.66 K/UL (ref 1.8–7.3)
NITRITE UR QL STRIP: NEGATIVE
PH UR STRIP: 6 [PH] (ref 5–9)
PLATELET # BLD AUTO: 206 K/UL (ref 130–450)
PMV BLD AUTO: 11.1 FL (ref 7–12)
POTASSIUM SERPL-SCNC: 3.9 MMOL/L (ref 3.5–5)
PROT SERPL-MCNC: 6.7 G/DL (ref 6.4–8.3)
PROT UR STRIP-MCNC: NEGATIVE MG/DL
RBC # BLD AUTO: 4.24 M/UL (ref 3.5–5.5)
SODIUM SERPL-SCNC: 143 MMOL/L (ref 132–146)
SP GR UR STRIP: 1.02 (ref 1–1.03)
TRIGL SERPL-MCNC: 82 MG/DL
TSH SERPL DL<=0.05 MIU/L-ACNC: 0.89 UIU/ML (ref 0.27–4.2)
UROBILINOGEN UR STRIP-ACNC: 0.2 EU/DL (ref 0–1)
VLDLC SERPL CALC-MCNC: 16 MG/DL
WBC OTHER # BLD: 7.7 K/UL (ref 4.5–11.5)

## 2024-04-30 ENCOUNTER — OFFICE VISIT (OUTPATIENT)
Dept: PODIATRY | Age: 74
End: 2024-04-30
Payer: MEDICARE

## 2024-04-30 VITALS
TEMPERATURE: 98.2 F | WEIGHT: 136 LBS | SYSTOLIC BLOOD PRESSURE: 118 MMHG | BODY MASS INDEX: 24.87 KG/M2 | DIASTOLIC BLOOD PRESSURE: 74 MMHG

## 2024-04-30 DIAGNOSIS — M79.674 PAIN IN RIGHT TOE(S): ICD-10-CM

## 2024-04-30 DIAGNOSIS — L84 CORN OR CALLUS: ICD-10-CM

## 2024-04-30 DIAGNOSIS — M79.675 PAIN IN LEFT TOE(S): ICD-10-CM

## 2024-04-30 DIAGNOSIS — R26.2 DIFFICULTY WALKING: ICD-10-CM

## 2024-04-30 DIAGNOSIS — M21.969 DEFORMITY OF METATARSAL, UNSPECIFIED LATERALITY: ICD-10-CM

## 2024-04-30 DIAGNOSIS — B35.1 TINEA UNGUIUM: Primary | ICD-10-CM

## 2024-04-30 DIAGNOSIS — I73.9 PERIPHERAL VASCULAR DISEASE, UNSPECIFIED (HCC): ICD-10-CM

## 2024-04-30 PROCEDURE — 99999 PR OFFICE/OUTPT VISIT,PROCEDURE ONLY: CPT | Performed by: PODIATRIST

## 2024-04-30 PROCEDURE — 11721 DEBRIDE NAIL 6 OR MORE: CPT | Performed by: PODIATRIST

## 2024-04-30 PROCEDURE — 11056 PARNG/CUTG B9 HYPRKR LES 2-4: CPT | Performed by: PODIATRIST

## 2024-04-30 RX ORDER — ROSUVASTATIN CALCIUM 5 MG/1
5 TABLET, COATED ORAL DAILY
COMMUNITY

## 2024-04-30 RX ORDER — CELECOXIB 200 MG/1
200 CAPSULE ORAL 2 TIMES DAILY
COMMUNITY

## 2024-04-30 RX ORDER — CICLOPIROX 80 MG/ML
SOLUTION TOPICAL
Qty: 6 ML | Refills: 2 | Status: SHIPPED | OUTPATIENT
Start: 2024-04-30

## 2024-04-30 NOTE — PROGRESS NOTES
[facility]  Return Patient    Chief Complaint   Patient presents with    Referral - General    Toe Pain    New Patient     Referred by her neighbor for nail care   Jovanni Jurado DO  4/24/2024       Subjective: This Tamie Porter comes to office for foot and nail care.  Pt currently has complaint of thickened, painful, elongated nails that he/she cannot manage by themselves.  Pt. Relates pain to nails with shoe gear.  Pt's primary care physician is Jovanni Jurado DO.  Past Medical History:   Diagnosis Date    Cancer (HCC)     breast right       Allergies   Allergen Reactions    Fish-Derived Products     Indomethacin      Current Outpatient Medications on File Prior to Visit   Medication Sig Dispense Refill    rosuvastatin (CRESTOR) 5 MG tablet Take 1 tablet by mouth daily      celecoxib (CELEBREX) 200 MG capsule Take 1 capsule by mouth 2 times daily      hydroCHLOROthiazide (HYDRODIURIL) 25 MG tablet TAKE 1 TABLET BY MOUTH EVERY DAY      lisinopril (PRINIVIL;ZESTRIL) 20 MG tablet 1.5 tablets      Cholecalciferol 100 MCG (4000 UT) CAPS Take  by mouth daily.      Magnesium Chloride (MAG-SR PO) Take 1,350 mg by mouth daily.      verapamil (CALAN SR) 180 MG extended release tablet Take 0.5 tablets by mouth nightly       No current facility-administered medications on file prior to visit.     Review of Systems  Objective:  General: AAO x 3 in NAD.    Derm  Toenail Description  Sites of Onychomycosis Involvement (Check affected area)  [x] [x] [x] [x] [x] [x] [x] [x] [x] [x]  5 4 3 2 1 1 2 3 4 5                          Right                                        Left    Thickness  [x] [x] [x] [x] [x] [x] [x] [x] [x] [x]  5 4 3 2 1 1 2 3 4 5                         Right                                        Left    Dystrophic Changes   [x] [x] [x] [x] [x] [x] [x] [x] [x] [x]  5 4 3 2 1 1 2 3 4 5                         Right

## 2024-07-23 ENCOUNTER — PROCEDURE VISIT (OUTPATIENT)
Dept: PODIATRY | Age: 74
End: 2024-07-23
Payer: MEDICARE

## 2024-07-23 VITALS
WEIGHT: 138 LBS | DIASTOLIC BLOOD PRESSURE: 82 MMHG | BODY MASS INDEX: 25.23 KG/M2 | SYSTOLIC BLOOD PRESSURE: 138 MMHG | TEMPERATURE: 97.8 F

## 2024-07-23 DIAGNOSIS — B35.1 ONYCHOMYCOSIS: ICD-10-CM

## 2024-07-23 DIAGNOSIS — R26.2 DIFFICULTY WALKING: ICD-10-CM

## 2024-07-23 DIAGNOSIS — L84 CORN OR CALLUS: ICD-10-CM

## 2024-07-23 DIAGNOSIS — I73.9 PERIPHERAL VASCULAR DISEASE, UNSPECIFIED (HCC): ICD-10-CM

## 2024-07-23 DIAGNOSIS — M79.675 PAIN IN LEFT TOE(S): ICD-10-CM

## 2024-07-23 DIAGNOSIS — B35.1 TINEA UNGUIUM: Primary | ICD-10-CM

## 2024-07-23 DIAGNOSIS — M21.969 DEFORMITY OF METATARSAL, UNSPECIFIED LATERALITY: ICD-10-CM

## 2024-07-23 DIAGNOSIS — M79.674 PAIN IN RIGHT TOE(S): ICD-10-CM

## 2024-07-23 PROCEDURE — 11721 DEBRIDE NAIL 6 OR MORE: CPT | Performed by: PODIATRIST

## 2024-07-23 NOTE — PROGRESS NOTES
[facility]  Return Patient    Chief Complaint   Patient presents with    Toe Pain     Jovanni Jurado DO  6/12/24    Nail Problem     [place] ck        Subjective: This Tamie Porter comes to office for foot and nail care.  Pt currently has complaint of thickened, painful, elongated nails that he/she cannot manage by themselves.  Pt. Relates pain to nails with shoe gear.  Pt's primary care physician is Jovanni Jurado DO.  Past Medical History:   Diagnosis Date    Cancer (HCC)     breast right       Allergies   Allergen Reactions    Fish-Derived Products     Indomethacin      Current Outpatient Medications on File Prior to Visit   Medication Sig Dispense Refill    rosuvastatin (CRESTOR) 5 MG tablet Take 1 tablet by mouth daily      celecoxib (CELEBREX) 200 MG capsule Take 1 capsule by mouth 2 times daily      ciclopirox (PENLAC) 8 % solution Apply topically nightly. 6 mL 2    hydroCHLOROthiazide (HYDRODIURIL) 25 MG tablet TAKE 1 TABLET BY MOUTH EVERY DAY      lisinopril (PRINIVIL;ZESTRIL) 20 MG tablet 1.5 tablets      Cholecalciferol 100 MCG (4000 UT) CAPS Take  by mouth daily.      Magnesium Chloride (MAG-SR PO) Take 1,350 mg by mouth daily.      verapamil (CALAN SR) 180 MG extended release tablet Take 0.5 tablets by mouth nightly       No current facility-administered medications on file prior to visit.     Review of Systems  Objective:  General: AAO x 3 in NAD.    Derm  Toenail Description  Sites of Onychomycosis Involvement (Check affected area)  [x] [x] [x] [x] [x] [x] [x] [x] [x] [x]  5 4 3 2 1 1 2 3 4 5                          Right                                        Left    Thickness  [x] [x] [x] [x] [x] [x] [x] [x] [x] [x]  5 4 3 2 1 1 2 3 4 5                         Right                                        Left    Dystrophic Changes   [x] [x] [x] [x] [x] [x] [x] [x] [x] [x]  5 4 3 2 1 1 2 3 4 5                         Right

## 2024-10-01 ENCOUNTER — PROCEDURE VISIT (OUTPATIENT)
Dept: PODIATRY | Age: 74
End: 2024-10-01
Payer: MEDICARE

## 2024-10-01 VITALS
WEIGHT: 138 LBS | TEMPERATURE: 97.6 F | BODY MASS INDEX: 25.23 KG/M2 | SYSTOLIC BLOOD PRESSURE: 126 MMHG | DIASTOLIC BLOOD PRESSURE: 70 MMHG

## 2024-10-01 DIAGNOSIS — M21.969 DEFORMITY OF METATARSAL, UNSPECIFIED LATERALITY: Primary | ICD-10-CM

## 2024-10-01 DIAGNOSIS — I73.9 PERIPHERAL VASCULAR DISEASE, UNSPECIFIED (HCC): ICD-10-CM

## 2024-10-01 DIAGNOSIS — M79.674 PAIN IN RIGHT TOE(S): ICD-10-CM

## 2024-10-01 DIAGNOSIS — R26.2 DIFFICULTY WALKING: ICD-10-CM

## 2024-10-01 DIAGNOSIS — B35.1 ONYCHOMYCOSIS: ICD-10-CM

## 2024-10-01 DIAGNOSIS — M79.675 PAIN IN LEFT TOE(S): ICD-10-CM

## 2024-10-01 DIAGNOSIS — L84 CORN OR CALLUS: ICD-10-CM

## 2024-10-01 PROCEDURE — 1123F ACP DISCUSS/DSCN MKR DOCD: CPT | Performed by: PODIATRIST

## 2024-10-01 PROCEDURE — 1036F TOBACCO NON-USER: CPT | Performed by: PODIATRIST

## 2024-10-01 PROCEDURE — G8400 PT W/DXA NO RESULTS DOC: HCPCS | Performed by: PODIATRIST

## 2024-10-01 PROCEDURE — G8427 DOCREV CUR MEDS BY ELIG CLIN: HCPCS | Performed by: PODIATRIST

## 2024-10-01 PROCEDURE — 1090F PRES/ABSN URINE INCON ASSESS: CPT | Performed by: PODIATRIST

## 2024-10-01 PROCEDURE — G8419 CALC BMI OUT NRM PARAM NOF/U: HCPCS | Performed by: PODIATRIST

## 2024-10-01 PROCEDURE — G8484 FLU IMMUNIZE NO ADMIN: HCPCS | Performed by: PODIATRIST

## 2024-10-01 PROCEDURE — 99212 OFFICE O/P EST SF 10 MIN: CPT | Performed by: PODIATRIST

## 2024-10-01 PROCEDURE — 3017F COLORECTAL CA SCREEN DOC REV: CPT | Performed by: PODIATRIST

## 2024-10-01 NOTE — PROGRESS NOTES
the metatarsal head.  Very painful with palpation there is no signs of infection.        Assessment and Plan: [facility] by [facility] them.  As for the callus porokeratosis fifth metatarsal head I did debride we will try Medihoney today with a U-shaped pad we will wait for the patient when she is ready for surgical correction that would be a resection of the metatarsal head.  Tamie was seen today for toe pain and nail problem.    Diagnoses and all orders for this visit:    Deformity of metatarsal, unspecified laterality    Difficulty walking    Corn or callus    Peripheral vascular disease, unspecified (HCC)    Pain in left toe(s)    Pain in right toe(s)    Onychomycosis        Seen By:  Randy Mckeon DPM      Document was created using voice recognition software.  Note was reviewed, however may contain grammatical errors.

## 2024-11-05 ENCOUNTER — OFFICE VISIT (OUTPATIENT)
Dept: FAMILY MEDICINE CLINIC | Age: 74
End: 2024-11-05

## 2024-11-05 VITALS
OXYGEN SATURATION: 98 % | BODY MASS INDEX: 24.87 KG/M2 | SYSTOLIC BLOOD PRESSURE: 132 MMHG | DIASTOLIC BLOOD PRESSURE: 76 MMHG | TEMPERATURE: 98.1 F | WEIGHT: 136 LBS | HEART RATE: 60 BPM

## 2024-11-05 DIAGNOSIS — H61.22 IMPACTED CERUMEN, LEFT EAR: Primary | ICD-10-CM

## 2024-11-05 NOTE — PROGRESS NOTES
Chief Complaint:   Ear Fullness (left)      History of Present Illness   Source of history provided by:  patient.    Tamie Porter is a 73 y.o. old female presenting to the walk-in for evaluation of plugged sensation in the left ear x  few  days. Denies associated nasal congestion, rhinorrhea, and sore throat. Denies any discharge from the ear canal. Has tried taking nothing OTC for relief. Denies any fever, chills, CP, SOB, abdominal pain, neck stiffness, rash, or lethargy.    Review of Systems   Unless otherwise stated in this report or unable to obtain because of the patient's clinical or mental status as evidenced by the medical record, this patients's positive and negative responses for Review of Systems, constitutional, psych, eyes, ENT, cardiovascular, respiratory, gastrointestinal, neurological, genitourinary, musculoskeletal, integument systems and systems related to the presenting problem are either stated in the preceding or were not pertinent or were negative for the symptoms and/or complaints related to the medical problem.    Past Medical History:  has a past medical history of Cancer (HCC).   Past Surgical History:  has a past surgical history that includes Mastectomy, partial and lymphadenectomy.  Social History:  reports that she has never smoked. She has never used smokeless tobacco. She reports that she does not drink alcohol and does not use drugs.  Family History: family history is not on file.  Allergies: Fish-derived products and Indomethacin    Physical Exam   Vital Signs:   Vitals:    11/05/24 0757   BP: 132/76   Pulse: 60   Temp: 98.1 °F (36.7 °C)   TempSrc: Temporal   SpO2: 98%   Weight: 61.7 kg (136 lb)     Oxygen Saturation Interpretation: Normal.    Constitutional:  Alert, development consistent with age.  Ears:  External Ears: Normal pinna bilaterally.                 TM's & External Canals: Left external canal is impacted with cerumen.  After removal of cerumen with irrigation

## 2024-11-19 ENCOUNTER — OFFICE VISIT (OUTPATIENT)
Dept: PODIATRY | Age: 74
End: 2024-11-19
Payer: MEDICARE

## 2024-11-19 VITALS
SYSTOLIC BLOOD PRESSURE: 124 MMHG | TEMPERATURE: 97.2 F | OXYGEN SATURATION: 98 % | HEART RATE: 62 BPM | BODY MASS INDEX: 24.87 KG/M2 | WEIGHT: 136 LBS | DIASTOLIC BLOOD PRESSURE: 70 MMHG

## 2024-11-19 DIAGNOSIS — M21.969 DEFORMITY OF METATARSAL, UNSPECIFIED LATERALITY: ICD-10-CM

## 2024-11-19 DIAGNOSIS — L84 CORN OR CALLUS: Primary | ICD-10-CM

## 2024-11-19 DIAGNOSIS — M79.675 PAIN IN LEFT TOE(S): ICD-10-CM

## 2024-11-19 DIAGNOSIS — I73.9 PERIPHERAL VASCULAR DISEASE, UNSPECIFIED (HCC): ICD-10-CM

## 2024-11-19 DIAGNOSIS — R26.2 DIFFICULTY WALKING: ICD-10-CM

## 2024-11-19 DIAGNOSIS — B35.1 TINEA UNGUIUM: ICD-10-CM

## 2024-11-19 DIAGNOSIS — M79.674 PAIN IN RIGHT TOE(S): ICD-10-CM

## 2024-11-19 DIAGNOSIS — B35.1 ONYCHOMYCOSIS: ICD-10-CM

## 2024-11-19 PROCEDURE — 1090F PRES/ABSN URINE INCON ASSESS: CPT | Performed by: PODIATRIST

## 2024-11-19 PROCEDURE — 3017F COLORECTAL CA SCREEN DOC REV: CPT | Performed by: PODIATRIST

## 2024-11-19 PROCEDURE — G8400 PT W/DXA NO RESULTS DOC: HCPCS | Performed by: PODIATRIST

## 2024-11-19 PROCEDURE — G8484 FLU IMMUNIZE NO ADMIN: HCPCS | Performed by: PODIATRIST

## 2024-11-19 PROCEDURE — G8420 CALC BMI NORM PARAMETERS: HCPCS | Performed by: PODIATRIST

## 2024-11-19 PROCEDURE — 1036F TOBACCO NON-USER: CPT | Performed by: PODIATRIST

## 2024-11-19 PROCEDURE — 99212 OFFICE O/P EST SF 10 MIN: CPT | Performed by: PODIATRIST

## 2024-11-19 PROCEDURE — G8427 DOCREV CUR MEDS BY ELIG CLIN: HCPCS | Performed by: PODIATRIST

## 2024-11-19 PROCEDURE — 1159F MED LIST DOCD IN RCRD: CPT | Performed by: PODIATRIST

## 2024-11-19 PROCEDURE — 1123F ACP DISCUSS/DSCN MKR DOCD: CPT | Performed by: PODIATRIST

## 2024-11-19 NOTE — PROGRESS NOTES
Tamie Porter : 1950 Sex: female  Age: 73 y.o.    Patient was referred by Jovanni Jurado DO    Chief Complaint   Patient presents with    Toe Pain     Jovanni Jurado DO  24       SUBJECTIVE patient is seen today for follow-up of several different issues #1 thick mycotic nails she is off [place] nails are still about the same but she is mostly here for very painful callus on the outside of the little toes been present for many years she has tried everything including shoes the only thing that really makes it better is with debridements.  Toe Pain       Review of Systems  Const: Denies constitutional symptoms  Musculo: Denies symptoms other than stated above  Skin: Denies symptoms other than stated above       Current Outpatient Medications:     rosuvastatin (CRESTOR) 5 MG tablet, Take 1 tablet by mouth daily, Disp: , Rfl:     celecoxib (CELEBREX) 200 MG capsule, Take 1 capsule by mouth 2 times daily, Disp: , Rfl:     hydroCHLOROthiazide (HYDRODIURIL) 25 MG tablet, TAKE 1 TABLET BY MOUTH EVERY DAY, Disp: , Rfl:     lisinopril (PRINIVIL;ZESTRIL) 20 MG tablet, 1.5 tablets, Disp: , Rfl:     Cholecalciferol 100 MCG (4000 UT) CAPS, Take  by mouth daily., Disp: , Rfl:     Magnesium Chloride (MAG-SR PO), Take 1,350 mg by mouth daily., Disp: , Rfl:     verapamil (CALAN SR) 180 MG extended release tablet, Take 0.5 tablets by mouth nightly, Disp: , Rfl:   Allergies   Allergen Reactions    Fish-Derived Products     Indomethacin        Past Medical History:   Diagnosis Date    Cancer (HCC)     breast right     Past Surgical History:   Procedure Laterality Date    LYMPHADENECTOMY      right    MASTECTOMY, PARTIAL       No family history on file.  Social History     Socioeconomic History    Marital status:      Spouse name: Not on file    Number of children: Not on file    Years of education: Not on file    Highest education level: Not on file   Occupational History    Not on file   Tobacco

## 2025-01-20 ENCOUNTER — OFFICE VISIT (OUTPATIENT)
Dept: FAMILY MEDICINE CLINIC | Age: 75
End: 2025-01-20

## 2025-01-20 VITALS
SYSTOLIC BLOOD PRESSURE: 128 MMHG | HEIGHT: 62 IN | RESPIRATION RATE: 17 BRPM | TEMPERATURE: 97.6 F | OXYGEN SATURATION: 98 % | BODY MASS INDEX: 24.55 KG/M2 | HEART RATE: 74 BPM | WEIGHT: 133.4 LBS | DIASTOLIC BLOOD PRESSURE: 68 MMHG

## 2025-01-20 DIAGNOSIS — M25.511 ACUTE PAIN OF RIGHT SHOULDER: Primary | ICD-10-CM

## 2025-01-20 RX ORDER — PREDNISONE 10 MG/1
TABLET ORAL
Qty: 18 TABLET | Refills: 0 | Status: SHIPPED | OUTPATIENT
Start: 2025-01-20

## 2025-01-20 RX ORDER — SULFAMETHOXAZOLE AND TRIMETHOPRIM 800; 160 MG/1; MG/1
1 TABLET ORAL 2 TIMES DAILY
COMMUNITY
Start: 2025-01-06

## 2025-01-20 RX ORDER — METHYLPREDNISOLONE ACETATE 80 MG/ML
80 INJECTION, SUSPENSION INTRA-ARTICULAR; INTRALESIONAL; INTRAMUSCULAR; SOFT TISSUE ONCE
Status: COMPLETED | OUTPATIENT
Start: 2025-01-20 | End: 2025-01-20

## 2025-01-20 RX ORDER — CIPROFLOXACIN 500 MG/1
TABLET, FILM COATED ORAL
COMMUNITY
Start: 2024-12-09

## 2025-01-20 RX ADMIN — METHYLPREDNISOLONE ACETATE 80 MG: 80 INJECTION, SUSPENSION INTRA-ARTICULAR; INTRALESIONAL; INTRAMUSCULAR; SOFT TISSUE at 08:14

## 2025-01-20 NOTE — PROGRESS NOTES
25  Tamie Porter : 1950 Sex: female  Age 74 y.o.      Subjective:  Chief Complaint   Patient presents with    Shoulder Pain     right    Arm Pain     right         HPI:     History of Present Illness  The patient is a 74-year-old female who presents for evaluation of right shoulder blade pain.    She reports experiencing inflammation in her right shoulder blade for the past 3 days, accompanied by numbness extending to her entire arm. The pain intensifies at night, particularly when she sleeps on her back, rendering it unbearable. She is right-handed and her occupation involves repetitive tasks such as slicing, dicing, chopping, and pulling. She has not sought radiographic evaluation for this issue. She reports no recent falls or injuries. She also reports no chest pain or shortness of breath. She is not diabetic. She has a history of rotator cuff surgery performed in .    SOCIAL HISTORY  She works at [employer].    ALLERGIES  The patient is allergic to FISH and INDOCIN.            ROS:   Unless otherwise stated in this report the patient's positive and negative responses for review of systems for constitutional, eyes, ENT, cardiovascular, respiratory, gastrointestinal, neurological, , musculoskeletal, and integument systems and related systems to the presenting problem are either stated in the history of present illness or were not pertinent or were negative for the symptoms and/or complaints related to the presenting medical problem.  Positives and pertinent negatives as per HPI.  All others reviewed and are negative.      PMH:     Past Medical History:   Diagnosis Date    Cancer (HCC)     breast right       Past Surgical History:   Procedure Laterality Date    LYMPHADENECTOMY      right    MASTECTOMY, PARTIAL         History reviewed. No pertinent family history.    Medications:     Current Outpatient Medications:     ciprofloxacin (CIPRO) 500 MG tablet, TAKE ONE TABLET BY MOUTH TWO TIMES

## 2025-01-21 ENCOUNTER — PROCEDURE VISIT (OUTPATIENT)
Dept: PODIATRY | Age: 75
End: 2025-01-21
Payer: MEDICARE

## 2025-01-21 VITALS
HEART RATE: 76 BPM | DIASTOLIC BLOOD PRESSURE: 72 MMHG | BODY MASS INDEX: 24.32 KG/M2 | WEIGHT: 133 LBS | TEMPERATURE: 97.1 F | SYSTOLIC BLOOD PRESSURE: 120 MMHG | OXYGEN SATURATION: 99 %

## 2025-01-21 DIAGNOSIS — M79.675 PAIN IN LEFT TOE(S): ICD-10-CM

## 2025-01-21 DIAGNOSIS — I73.9 PERIPHERAL VASCULAR DISEASE, UNSPECIFIED (HCC): ICD-10-CM

## 2025-01-21 DIAGNOSIS — M79.674 PAIN IN RIGHT TOE(S): ICD-10-CM

## 2025-01-21 DIAGNOSIS — M21.969 DEFORMITY OF METATARSAL, UNSPECIFIED LATERALITY: ICD-10-CM

## 2025-01-21 DIAGNOSIS — B35.1 TINEA UNGUIUM: ICD-10-CM

## 2025-01-21 DIAGNOSIS — R26.2 DIFFICULTY WALKING: ICD-10-CM

## 2025-01-21 DIAGNOSIS — B35.1 ONYCHOMYCOSIS: ICD-10-CM

## 2025-01-21 DIAGNOSIS — L84 CORN OR CALLUS: Primary | ICD-10-CM

## 2025-01-21 PROCEDURE — 1159F MED LIST DOCD IN RCRD: CPT | Performed by: PODIATRIST

## 2025-01-21 PROCEDURE — 1090F PRES/ABSN URINE INCON ASSESS: CPT | Performed by: PODIATRIST

## 2025-01-21 PROCEDURE — 99212 OFFICE O/P EST SF 10 MIN: CPT | Performed by: PODIATRIST

## 2025-01-21 PROCEDURE — 1036F TOBACCO NON-USER: CPT | Performed by: PODIATRIST

## 2025-01-21 PROCEDURE — G8420 CALC BMI NORM PARAMETERS: HCPCS | Performed by: PODIATRIST

## 2025-01-21 PROCEDURE — 3017F COLORECTAL CA SCREEN DOC REV: CPT | Performed by: PODIATRIST

## 2025-01-21 PROCEDURE — G8427 DOCREV CUR MEDS BY ELIG CLIN: HCPCS | Performed by: PODIATRIST

## 2025-01-21 PROCEDURE — 1123F ACP DISCUSS/DSCN MKR DOCD: CPT | Performed by: PODIATRIST

## 2025-01-21 PROCEDURE — G8400 PT W/DXA NO RESULTS DOC: HCPCS | Performed by: PODIATRIST

## 2025-01-21 NOTE — PROGRESS NOTES
Tamie Porter : 1950 Sex: female  Age: 74 y.o.    Patient was referred by Jovanni Jurado DO    Chief Complaint   Patient presents with    Callouses    Toe Pain     Jovanni Jurado DO  LOV 10/10/24       SUBJECTIVE patient is seen today for follow-up of several different issues #1 thick mycotic nails she is off [place] nails are still about the same but she is mostly here for very painful callus on the outside of the little toes been present for many years she has tried everything including shoes the only thing that really makes it better is with debridements.  Toe Pain       Review of Systems  Const: Denies constitutional symptoms  Musculo: Denies symptoms other than stated above  Skin: Denies symptoms other than stated above       Current Outpatient Medications:     ciprofloxacin (CIPRO) 500 MG tablet, TAKE ONE TABLET BY MOUTH TWO TIMES A DAY AS DIRECTED, Disp: , Rfl:     sulfamethoxazole-trimethoprim (BACTRIM DS;SEPTRA DS) 800-160 MG per tablet, Take 1 tablet by mouth 2 times daily, Disp: , Rfl:     predniSONE (DELTASONE) 10 MG tablet, 3 tabs once daily for 3 days, 2 tabs once daily for 3 days, 1 tab once daily for 3 days, Disp: 18 tablet, Rfl: 0    rosuvastatin (CRESTOR) 5 MG tablet, Take 1 tablet by mouth daily, Disp: , Rfl:     celecoxib (CELEBREX) 200 MG capsule, Take 1 capsule by mouth 2 times daily, Disp: , Rfl:     hydroCHLOROthiazide (HYDRODIURIL) 25 MG tablet, TAKE 1 TABLET BY MOUTH EVERY DAY, Disp: , Rfl:     lisinopril (PRINIVIL;ZESTRIL) 20 MG tablet, 1.5 tablets, Disp: , Rfl:     Cholecalciferol 100 MCG (4000 UT) CAPS, Take  by mouth daily., Disp: , Rfl:     verapamil (CALAN SR) 180 MG extended release tablet, Take 0.5 tablets by mouth nightly, Disp: , Rfl:   Allergies   Allergen Reactions    Fish-Derived Products     Indomethacin        Past Medical History:   Diagnosis Date    Cancer (HCC)     breast right     Past Surgical History:   Procedure Laterality Date

## 2025-03-15 ENCOUNTER — HOSPITAL ENCOUNTER (EMERGENCY)
Age: 75
Discharge: HOME | End: 2025-03-15
Attending: STUDENT IN AN ORGANIZED HEALTH CARE EDUCATION/TRAINING PROGRAM
Payer: MEDICARE

## 2025-03-15 ENCOUNTER — APPOINTMENT (OUTPATIENT)
Dept: CT IMAGING | Age: 75
End: 2025-03-15
Payer: MEDICARE

## 2025-03-15 VITALS
OXYGEN SATURATION: 97 % | WEIGHT: 133 LBS | SYSTOLIC BLOOD PRESSURE: 139 MMHG | HEART RATE: 75 BPM | TEMPERATURE: 97.7 F | DIASTOLIC BLOOD PRESSURE: 71 MMHG | HEIGHT: 62 IN | BODY MASS INDEX: 24.48 KG/M2 | RESPIRATION RATE: 18 BRPM

## 2025-03-15 DIAGNOSIS — N81.10 FEMALE BLADDER PROLAPSE: Primary | ICD-10-CM

## 2025-03-15 DIAGNOSIS — K59.00 CONSTIPATION, UNSPECIFIED CONSTIPATION TYPE: ICD-10-CM

## 2025-03-15 DIAGNOSIS — N94.9 ADNEXAL CYST: ICD-10-CM

## 2025-03-15 DIAGNOSIS — K76.0 FATTY LIVER: ICD-10-CM

## 2025-03-15 DIAGNOSIS — N30.01 ACUTE CYSTITIS WITH HEMATURIA: ICD-10-CM

## 2025-03-15 LAB
ALBUMIN SERPL-MCNC: 4.1 G/DL (ref 3.5–5.2)
ALP SERPL-CCNC: 103 U/L (ref 35–104)
ALT SERPL-CCNC: 25 U/L (ref 0–32)
ANION GAP SERPL CALCULATED.3IONS-SCNC: 11 MMOL/L (ref 7–16)
AST SERPL-CCNC: 23 U/L (ref 0–31)
BACTERIA URNS QL MICRO: ABNORMAL
BASOPHILS # BLD: 0.02 K/UL (ref 0–0.2)
BASOPHILS NFR BLD: 0 % (ref 0–2)
BILIRUB SERPL-MCNC: 0.7 MG/DL (ref 0–1.2)
BILIRUB UR QL STRIP: NEGATIVE
BUN SERPL-MCNC: 23 MG/DL (ref 6–23)
CALCIUM SERPL-MCNC: 9.5 MG/DL (ref 8.6–10.2)
CHLORIDE SERPL-SCNC: 103 MMOL/L (ref 98–107)
CLARITY UR: ABNORMAL
CO2 SERPL-SCNC: 28 MMOL/L (ref 22–29)
COLOR UR: YELLOW
CREAT SERPL-MCNC: 0.7 MG/DL (ref 0.5–1)
EOSINOPHIL # BLD: 0 K/UL (ref 0.05–0.5)
EOSINOPHILS RELATIVE PERCENT: 0 % (ref 0–6)
ERYTHROCYTE [DISTWIDTH] IN BLOOD BY AUTOMATED COUNT: 12.2 % (ref 11.5–15)
GFR, ESTIMATED: >90 ML/MIN/1.73M2
GLUCOSE SERPL-MCNC: 91 MG/DL (ref 74–99)
GLUCOSE UR STRIP-MCNC: NEGATIVE MG/DL
HCT VFR BLD AUTO: 43.1 % (ref 34–48)
HGB BLD-MCNC: 14.4 G/DL (ref 11.5–15.5)
HGB UR QL STRIP.AUTO: ABNORMAL
IMM GRANULOCYTES # BLD AUTO: 0.03 K/UL (ref 0–0.58)
IMM GRANULOCYTES NFR BLD: 0 % (ref 0–5)
KETONES UR STRIP-MCNC: NEGATIVE MG/DL
LACTATE BLDV-SCNC: 0.7 MMOL/L (ref 0.5–2.2)
LEUKOCYTE ESTERASE UR QL STRIP: ABNORMAL
LIPASE SERPL-CCNC: 27 U/L (ref 13–60)
LYMPHOCYTES NFR BLD: 1.07 K/UL (ref 1.5–4)
LYMPHOCYTES RELATIVE PERCENT: 10 % (ref 20–42)
MCH RBC QN AUTO: 34.3 PG (ref 26–35)
MCHC RBC AUTO-ENTMCNC: 33.4 G/DL (ref 32–34.5)
MCV RBC AUTO: 102.6 FL (ref 80–99.9)
MONOCYTES NFR BLD: 0.68 K/UL (ref 0.1–0.95)
MONOCYTES NFR BLD: 6 % (ref 2–12)
NEUTROPHILS NFR BLD: 84 % (ref 43–80)
NEUTS SEG NFR BLD: 9.5 K/UL (ref 1.8–7.3)
NITRITE UR QL STRIP: NEGATIVE
PH UR STRIP: 6.5 [PH] (ref 5–8)
PLATELET # BLD AUTO: 200 K/UL (ref 130–450)
PMV BLD AUTO: 9.7 FL (ref 7–12)
POTASSIUM SERPL-SCNC: 3.6 MMOL/L (ref 3.5–5)
PROT SERPL-MCNC: 6.7 G/DL (ref 6.4–8.3)
PROT UR STRIP-MCNC: ABNORMAL MG/DL
RBC # BLD AUTO: 4.2 M/UL (ref 3.5–5.5)
RBC #/AREA URNS HPF: ABNORMAL /HPF
SODIUM SERPL-SCNC: 142 MMOL/L (ref 132–146)
SP GR UR STRIP: 1.02 (ref 1–1.03)
UROBILINOGEN UR STRIP-ACNC: 0.2 EU/DL (ref 0–1)
WBC #/AREA URNS HPF: ABNORMAL /HPF
WBC OTHER # BLD: 11.3 K/UL (ref 4.5–11.5)

## 2025-03-15 PROCEDURE — 6360000002 HC RX W HCPCS: Performed by: STUDENT IN AN ORGANIZED HEALTH CARE EDUCATION/TRAINING PROGRAM

## 2025-03-15 PROCEDURE — 74177 CT ABD & PELVIS W/CONTRAST: CPT

## 2025-03-15 PROCEDURE — 83605 ASSAY OF LACTIC ACID: CPT

## 2025-03-15 PROCEDURE — 83690 ASSAY OF LIPASE: CPT

## 2025-03-15 PROCEDURE — 80053 COMPREHEN METABOLIC PANEL: CPT

## 2025-03-15 PROCEDURE — 85025 COMPLETE CBC W/AUTO DIFF WBC: CPT

## 2025-03-15 PROCEDURE — 99285 EMERGENCY DEPT VISIT HI MDM: CPT

## 2025-03-15 PROCEDURE — 81001 URINALYSIS AUTO W/SCOPE: CPT

## 2025-03-15 PROCEDURE — 87086 URINE CULTURE/COLONY COUNT: CPT

## 2025-03-15 PROCEDURE — 6360000004 HC RX CONTRAST MEDICATION: Performed by: RADIOLOGY

## 2025-03-15 PROCEDURE — 2500000003 HC RX 250 WO HCPCS: Performed by: STUDENT IN AN ORGANIZED HEALTH CARE EDUCATION/TRAINING PROGRAM

## 2025-03-15 PROCEDURE — 96374 THER/PROPH/DIAG INJ IV PUSH: CPT

## 2025-03-15 PROCEDURE — 87088 URINE BACTERIA CULTURE: CPT

## 2025-03-15 RX ORDER — IOPAMIDOL 755 MG/ML
75 INJECTION, SOLUTION INTRAVASCULAR
Status: COMPLETED | OUTPATIENT
Start: 2025-03-15 | End: 2025-03-15

## 2025-03-15 RX ORDER — CEFDINIR 300 MG/1
300 CAPSULE ORAL 2 TIMES DAILY
Qty: 14 CAPSULE | Refills: 0 | Status: SHIPPED | OUTPATIENT
Start: 2025-03-15 | End: 2025-03-22

## 2025-03-15 RX ORDER — POLYETHYLENE GLYCOL 3350 17 G/17G
17 POWDER, FOR SOLUTION ORAL DAILY
Qty: 510 G | Refills: 0 | Status: SHIPPED | OUTPATIENT
Start: 2025-03-15 | End: 2025-04-14

## 2025-03-15 RX ORDER — GINSENG 100 MG
CAPSULE ORAL ONCE
Status: DISCONTINUED | OUTPATIENT
Start: 2025-03-15 | End: 2025-03-16 | Stop reason: HOSPADM

## 2025-03-15 RX ORDER — DOCUSATE SODIUM 100 MG/1
100 CAPSULE, LIQUID FILLED ORAL DAILY
Qty: 5 CAPSULE | Refills: 0 | Status: SHIPPED | OUTPATIENT
Start: 2025-03-15 | End: 2025-03-20

## 2025-03-15 RX ADMIN — IOPAMIDOL 75 ML: 755 INJECTION, SOLUTION INTRAVENOUS at 17:43

## 2025-03-15 RX ADMIN — WATER 2000 MG: 1 INJECTION INTRAMUSCULAR; INTRAVENOUS; SUBCUTANEOUS at 19:14

## 2025-03-15 ASSESSMENT — PAIN DESCRIPTION - LOCATION: LOCATION: BACK;OTHER (COMMENT)

## 2025-03-15 ASSESSMENT — ENCOUNTER SYMPTOMS
SHORTNESS OF BREATH: 0
NAUSEA: 0
DIARRHEA: 0
WHEEZING: 0
EYE PAIN: 0
BACK PAIN: 1
SORE THROAT: 0
EYE REDNESS: 0
VOMITING: 0
ABDOMINAL DISTENTION: 0
EYE DISCHARGE: 0
COUGH: 0
SINUS PRESSURE: 0

## 2025-03-15 ASSESSMENT — LIFESTYLE VARIABLES
HOW MANY STANDARD DRINKS CONTAINING ALCOHOL DO YOU HAVE ON A TYPICAL DAY: PATIENT DOES NOT DRINK
HOW OFTEN DO YOU HAVE A DRINK CONTAINING ALCOHOL: NEVER
HOW OFTEN DO YOU HAVE A DRINK CONTAINING ALCOHOL: NEVER
HOW MANY STANDARD DRINKS CONTAINING ALCOHOL DO YOU HAVE ON A TYPICAL DAY: PATIENT DOES NOT DRINK

## 2025-03-15 ASSESSMENT — PAIN SCALES - GENERAL: PAINLEVEL_OUTOF10: 8

## 2025-03-15 NOTE — ED TRIAGE NOTES
Pt has prolapsed bladder. Pessary removed Friday after 2 weeks of bleeding. After removal bladder prolapsed again and began bleeding more heavily

## 2025-03-15 NOTE — ED TRIAGE NOTES
FIRST PROVIDER CONTACT ASSESSMENT NOTE       Department of Emergency Medicine                 First Provider Note            3/15/25  3:49 PM EDT    Date of Encounter: No admission date for patient encounter.    Patient Name: Tamie Porter  : 1950  MRN: [id number]    Chief Complaint: Bladder Problem (Prolapsed bladder. Pessary removed friday. /) and Vaginal Bleeding (Consistent bleeding from prolapsed bladder. )      History of Present Illness:   Tamie Porter is a 74 y.o. female who presents to the ED for prolapsed bladder.   Pessary removed Friday at urology office.   Bleeding worse now.        Focused Physical Exam:  VS:    ED Triage Vitals [03/15/25 1546]   BP Systolic BP Percentile Diastolic BP Percentile Temp Temp src Pulse Resp SpO2   -- -- -- -- -- -- -- --      Height Weight - Scale         1.575 m (5' 2\") 60.3 kg (133 lb)              Physical Ex: Constitutional: Alert and non-toxic.    Medical History:  has a past medical history of Cancer (HCC).  Surgical History:  has a past surgical history that includes Mastectomy, partial and lymphadenectomy.  Social History:  reports that she has never smoked. She has never used smokeless tobacco. She reports that she does not drink alcohol and does not use drugs.  Family History: family history is not on file.    Allergies: Fish-derived products and Indomethacin     Initial Plan of Care: Initiate Treatment-Testing, Proceed toTreatment Area When Bed Available for ED Attending/MLP to Continue Care      ---END OF FIRST PROVIDER CONTACT ASSESSMENT NOTE---  Electronically signed by Susan Byrd PA-C   DD: 3/15/25

## 2025-03-15 NOTE — ED PROVIDER NOTES
Department of Emergency Medicine   ED  Provider Note  Admit Date/RoomTime: 3/15/2025  3:51 PM  ED Room: 04/04              [facility]     Tamie Porter is a 74 y.o. female with a PMHx significant for HTN, HLD who presents for evaluation of back pain, pelvic pain, bleeding, beginning prior to arrival. The complaint has been persistent, moderate in severity, and worsened by nothing.   The patient states that back in October she saw Dr. Rodriguez, and had a pessary placed for bladder prolapse. Notes that it was then causing UTI's which she had for four months and the pessary was removed, and was replaced. She was placed on antibiotics for that long. States that she went to see [facility] and UA grew out E. Coli.   States that when she saw urology yesterday they had removed the pessary, cleaned it and then placed it back in. She has been having persistent bleeding, that has been worse since then. Bleed has been 12 days now and then since yesterday it has been bright red blood. She has been having to wear pads for this. Notes that every time she goes to the bathroom she must change her pad which she states is about every 30 minutes.       Review of Systems   Constitutional:  Negative for chills and fever.   HENT:  Negative for ear pain, sinus pressure and sore throat.    Eyes:  Negative for pain, discharge and redness.   Respiratory:  Negative for cough, shortness of breath and wheezing.    Cardiovascular:  Negative for chest pain.   Gastrointestinal:  Negative for abdominal distention, diarrhea, nausea and vomiting.   Genitourinary:  Positive for pelvic pain and vaginal bleeding. Negative for dysuria and frequency.   Musculoskeletal:  Positive for back pain. Negative for arthralgias.   Skin:  Negative for rash and wound.   Neurological:  Negative for weakness and headaches.   Hematological:  Negative for adenopathy.   All other systems reviewed and are negative.       Physical Exam  Vitals and nursing note reviewed.  NOTES ---------------------------------  At this time the patient is without objective evidence of an acute process requiring hospitalization or inpatient management.  They have remained hemodynamically stable throughout their entire ED visit and are stable for discharge with outpatient follow-up.     The plan has been discussed in detail and they are aware of the specific conditions for emergent return, as well as the importance of follow-up.      Discharge Medication List as of 3/15/2025  9:53 PM        START taking these medications    Details   cefdinir (OMNICEF) 300 MG capsule Take 1 capsule by mouth 2 times daily for 7 days, Disp-14 capsule, R-0Normal      docusate sodium (COLACE) 100 MG capsule Take 1 capsule by mouth daily for 5 days, Disp-5 capsule, R-0Normal      polyethylene glycol (GLYCOLAX) 17 GM/SCOOP powder Take 17 g by mouth daily, Disp-510 g, R-0Normal             Diagnosis:  1. Female bladder prolapse    2. Acute cystitis with hematuria    3. Constipation, unspecified constipation type    4. Fatty liver    5. Adnexal cyst        Disposition:  Patient's disposition: Discharge to home  Patient's condition is stable.       Ronan Muhammad DO  03/16/25 0054

## 2025-03-16 NOTE — DISCHARGE INSTRUCTIONS
CT ABDOMEN PELVIS W IV CONTRAST Additional Contrast? None   Final Result   1. Moderate to severe bilateral hydronephrosis. No obstructing stone. The   ureters course lower into the pelvis than typical distal and posterior to the   urinary bladder.   2. Small bilateral nonobstructing kidney stones.   3. Decompressed urinary bladder with mild wall thickening. Correlate with   urinalysis to exclude cystitis.   4. Simple appearing right adnexal cyst measuring 3.5 x 3.0 cm. Recommend   follow-up pelvic ultrasound in 6-12 months.   5. Fatty liver.   6. Few scattered small hypodensities in the liver too small to further   characterize but statistically benign in the absence of a known malignancy.   7. Moderate colonic fecal retention. Correlate for constipation.           Follow-up with urology.  If symptoms worsen or concern arises please return for reevaluation.

## 2025-03-18 ENCOUNTER — RESULTS FOLLOW-UP (OUTPATIENT)
Dept: EMERGENCY DEPT | Age: 75
End: 2025-03-18

## 2025-03-18 LAB
MICROORGANISM SPEC CULT: ABNORMAL
SERVICE CMNT-IMP: ABNORMAL
SPECIMEN DESCRIPTION: ABNORMAL

## 2025-03-25 ENCOUNTER — PROCEDURE VISIT (OUTPATIENT)
Dept: PODIATRY | Age: 75
End: 2025-03-25
Payer: MEDICARE

## 2025-03-25 VITALS
BODY MASS INDEX: 24.51 KG/M2 | OXYGEN SATURATION: 96 % | TEMPERATURE: 97.5 F | DIASTOLIC BLOOD PRESSURE: 63 MMHG | HEART RATE: 73 BPM | SYSTOLIC BLOOD PRESSURE: 123 MMHG | WEIGHT: 134 LBS

## 2025-03-25 DIAGNOSIS — M79.675 PAIN IN LEFT TOE(S): ICD-10-CM

## 2025-03-25 DIAGNOSIS — I73.9 PERIPHERAL VASCULAR DISEASE, UNSPECIFIED: ICD-10-CM

## 2025-03-25 DIAGNOSIS — M21.969 DEFORMITY OF METATARSAL, UNSPECIFIED LATERALITY: ICD-10-CM

## 2025-03-25 DIAGNOSIS — B35.1 ONYCHOMYCOSIS: ICD-10-CM

## 2025-03-25 DIAGNOSIS — B35.1 TINEA UNGUIUM: ICD-10-CM

## 2025-03-25 DIAGNOSIS — L84 CORN OR CALLUS: Primary | ICD-10-CM

## 2025-03-25 DIAGNOSIS — R26.2 DIFFICULTY WALKING: ICD-10-CM

## 2025-03-25 DIAGNOSIS — M79.674 PAIN IN RIGHT TOE(S): ICD-10-CM

## 2025-03-25 PROCEDURE — 11055 PARING/CUTG B9 HYPRKER LES 1: CPT | Performed by: PODIATRIST

## 2025-03-25 PROCEDURE — 11721 DEBRIDE NAIL 6 OR MORE: CPT | Performed by: PODIATRIST

## 2025-03-25 NOTE — PROGRESS NOTES
[facility]  Return Patient    Chief Complaint   Patient presents with    Callouses    Toe Pain     Jovanni Jurado DO  LOV 3/18/25       Subjective: This Tamie Porter comes to office for foot and nail care.  Pt currently has complaint of thickened, painful, elongated nails that he/she cannot manage by themselves.  Pt. Relates pain to nails with shoe gear.  Pt's primary care physician is Jovanni Jurado DO.  Past Medical History:   Diagnosis Date    Cancer (HCC)     breast right       Allergies   Allergen Reactions    Fish-Derived Products     Indomethacin      Current Outpatient Medications on File Prior to Visit   Medication Sig Dispense Refill    polyethylene glycol (GLYCOLAX) 17 GM/SCOOP powder Take 17 g by mouth daily 510 g 0    ciprofloxacin (CIPRO) 500 MG tablet TAKE ONE TABLET BY MOUTH TWO TIMES A DAY AS DIRECTED      sulfamethoxazole-trimethoprim (BACTRIM DS;SEPTRA DS) 800-160 MG per tablet Take 1 tablet by mouth 2 times daily      rosuvastatin (CRESTOR) 5 MG tablet Take 1 tablet by mouth daily      celecoxib (CELEBREX) 200 MG capsule Take 1 capsule by mouth 2 times daily      hydroCHLOROthiazide (HYDRODIURIL) 25 MG tablet TAKE 1 TABLET BY MOUTH EVERY DAY      lisinopril (PRINIVIL;ZESTRIL) 20 MG tablet 1.5 tablets      Cholecalciferol 100 MCG (4000 UT) CAPS Take  by mouth daily.      verapamil (CALAN SR) 180 MG extended release tablet Take 0.5 tablets by mouth nightly       No current facility-administered medications on file prior to visit.     Review of Systems  Objective:  General: AAO x 3 in NAD.    Derm  Toenail Description  Sites of Onychomycosis Involvement (Check affected area)  [x] [x] [x] [x] [x] [x] [x] [x] [x] [x]  5 4 3 2 1 1 2 3 4 5                          Right                                        Left    Thickness  [x] [x] [x] [x] [x] [x] [x] [x] [x] [x]  5 4 3 2 1 1 2 3 4 5                         Right                                        Left    Dystrophic

## 2025-03-31 ENCOUNTER — OFFICE VISIT (OUTPATIENT)
Dept: FAMILY MEDICINE CLINIC | Age: 75
End: 2025-03-31
Payer: MEDICARE

## 2025-03-31 VITALS
SYSTOLIC BLOOD PRESSURE: 128 MMHG | HEIGHT: 62 IN | BODY MASS INDEX: 25.03 KG/M2 | DIASTOLIC BLOOD PRESSURE: 78 MMHG | OXYGEN SATURATION: 95 % | TEMPERATURE: 98.7 F | WEIGHT: 136 LBS | RESPIRATION RATE: 18 BRPM | HEART RATE: 67 BPM

## 2025-03-31 DIAGNOSIS — R21 RASH: ICD-10-CM

## 2025-03-31 DIAGNOSIS — L30.9 DERMATITIS: Primary | ICD-10-CM

## 2025-03-31 LAB — S PYO AG THROAT QL: NORMAL

## 2025-03-31 PROCEDURE — 99203 OFFICE O/P NEW LOW 30 MIN: CPT

## 2025-03-31 PROCEDURE — G8420 CALC BMI NORM PARAMETERS: HCPCS

## 2025-03-31 PROCEDURE — G8400 PT W/DXA NO RESULTS DOC: HCPCS

## 2025-03-31 PROCEDURE — 1159F MED LIST DOCD IN RCRD: CPT

## 2025-03-31 PROCEDURE — 1036F TOBACCO NON-USER: CPT

## 2025-03-31 PROCEDURE — 1090F PRES/ABSN URINE INCON ASSESS: CPT

## 2025-03-31 PROCEDURE — 87880 STREP A ASSAY W/OPTIC: CPT

## 2025-03-31 PROCEDURE — G8427 DOCREV CUR MEDS BY ELIG CLIN: HCPCS

## 2025-03-31 PROCEDURE — 1160F RVW MEDS BY RX/DR IN RCRD: CPT

## 2025-03-31 PROCEDURE — 1123F ACP DISCUSS/DSCN MKR DOCD: CPT

## 2025-03-31 PROCEDURE — 96372 THER/PROPH/DIAG INJ SC/IM: CPT

## 2025-03-31 PROCEDURE — 3017F COLORECTAL CA SCREEN DOC REV: CPT

## 2025-03-31 RX ORDER — METHYLPREDNISOLONE 4 MG/1
TABLET ORAL
Qty: 1 KIT | Refills: 0 | Status: SHIPPED | OUTPATIENT
Start: 2025-03-31

## 2025-03-31 RX ORDER — TRIAMCINOLONE ACETONIDE 40 MG/ML
40 INJECTION, SUSPENSION INTRA-ARTICULAR; INTRAMUSCULAR ONCE
Status: COMPLETED | OUTPATIENT
Start: 2025-03-31 | End: 2025-03-31

## 2025-03-31 RX ADMIN — TRIAMCINOLONE ACETONIDE 40 MG: 40 INJECTION, SUSPENSION INTRA-ARTICULAR; INTRAMUSCULAR at 08:27

## 2025-03-31 NOTE — PROGRESS NOTES
Chief Complaint   Allergic Reaction and Rash    History of Present Illness   Source of history provided by:  patient.  History of Present Illness  The patient is a 74-year-old female who presents for evaluation of a rash.    She reports the onset of an itchy rash on Friday night, initially localized to her back but has since spread to her chest, face, and other body parts. The rash is described as intensely pruritic. She suspects a potential allergic reaction to a food item containing nuts that she consumed on Saturday, although she has no known allergies to nuts. She has not made any recent changes to her laundry detergent. Contact with her manager, who was ill possibly with influenza, is noted, but she herself has not exhibited any symptoms of illness. A known allergy to fish, which causes throat swelling upon ingestion, is reported. Self-treatment with Benadryl resulted in dizziness, possibly due to an overdose. She has received three shingles vaccinations in the past.     All other ROS negative unless otherwise stated in HPI.       ROS    Unless otherwise stated in this report or unable to obtain because of the patient's clinical or mental status as evidenced by the medical record, this patients's positive and negative responses for Review of Systems, constitutional, psych, eyes, ENT, cardiovascular, respiratory, gastrointestinal, neurological, genitourinary, musculoskeletal, integument systems and systems related to the presenting problem are either stated in the preceding or were not pertinent or were negative for the symptoms and/or complaints related to the medical problem.      Physical Exam         VS:  /78   Pulse 67   Temp 98.7 °F (37.1 °C)   Resp 18   Ht 1.575 m (5' 2.01\")   Wt 61.7 kg (136 lb)   SpO2 95%   BMI 24.87 kg/m²    Oxygen Saturation Interpretation: Normal.    Constitutional:  Alert, development consistent with age.  HEENT:  NC/NT.  Airway patent.  Eyes:  PERRL, EOMI, no

## 2025-05-06 ENCOUNTER — PROCEDURE VISIT (OUTPATIENT)
Dept: PODIATRY | Age: 75
End: 2025-05-06
Payer: MEDICARE

## 2025-05-06 VITALS
SYSTOLIC BLOOD PRESSURE: 145 MMHG | BODY MASS INDEX: 24.87 KG/M2 | WEIGHT: 136 LBS | TEMPERATURE: 97.1 F | DIASTOLIC BLOOD PRESSURE: 72 MMHG

## 2025-05-06 DIAGNOSIS — I73.9 PERIPHERAL VASCULAR DISEASE, UNSPECIFIED: ICD-10-CM

## 2025-05-06 DIAGNOSIS — L84 CORN OR CALLUS: Primary | ICD-10-CM

## 2025-05-06 DIAGNOSIS — R26.2 DIFFICULTY WALKING: ICD-10-CM

## 2025-05-06 DIAGNOSIS — M21.969 DEFORMITY OF METATARSAL, UNSPECIFIED LATERALITY: ICD-10-CM

## 2025-05-06 PROCEDURE — 11055 PARING/CUTG B9 HYPRKER LES 1: CPT | Performed by: PODIATRIST

## 2025-05-06 NOTE — PROGRESS NOTES
[facility]  Return Patient    Chief Complaint   Patient presents with    Callkristin     Jovanni Jurado DO  LOV 4/29/25       Subjective: This Tamie Porter comes to office for foot and nail care.  Pt currently has complaint of thickened, painful, elongated nails that he/she cannot manage by themselves.  Pt. Relates pain to nails with shoe gear.  Pt's primary care physician is Jovanni Jurado DO.  Past Medical History:   Diagnosis Date    Cancer (HCC)     breast right       Allergies   Allergen Reactions    Fish-Derived Products     Indomethacin      Current Outpatient Medications on File Prior to Visit   Medication Sig Dispense Refill    methylPREDNISolone (MEDROL DOSEPACK) 4 MG tablet Take by mouth. 1 kit 0    ciprofloxacin (CIPRO) 500 MG tablet TAKE ONE TABLET BY MOUTH TWO TIMES A DAY AS DIRECTED      rosuvastatin (CRESTOR) 5 MG tablet Take 1 tablet by mouth daily      celecoxib (CELEBREX) 200 MG capsule Take 1 capsule by mouth 2 times daily      hydroCHLOROthiazide (HYDRODIURIL) 25 MG tablet TAKE 1 TABLET BY MOUTH EVERY DAY      lisinopril (PRINIVIL;ZESTRIL) 20 MG tablet 1.5 tablets      Cholecalciferol 100 MCG (4000 UT) CAPS Take  by mouth daily.      verapamil (CALAN SR) 180 MG extended release tablet Take 0.5 tablets by mouth nightly       No current facility-administered medications on file prior to visit.     Review of Systems  Objective:  General: AAO x 3 in NAD.    Derm  Toenail Description  Sites of Onychomycosis Involvement (Check affected area)  [x] [x] [x] [x] [x] [x] [x] [x] [x] [x]  5 4 3 2 1 1 2 3 4 5                          Right                                        Left    Thickness  [x] [x] [x] [x] [x] [x] [x] [x] [x] [x]  5 4 3 2 1 1 2 3 4 5                         Right                                        Left    Dystrophic Changes   [x] [x] [x] [x] [x] [x] [x] [x] [x] [x]  5 4 3 2 1 1 2 3 4 5                         Right

## 2025-06-24 ENCOUNTER — OFFICE VISIT (OUTPATIENT)
Dept: FAMILY MEDICINE CLINIC | Age: 75
End: 2025-06-24
Payer: MEDICARE

## 2025-06-24 ENCOUNTER — RESULTS FOLLOW-UP (OUTPATIENT)
Dept: FAMILY MEDICINE CLINIC | Age: 75
End: 2025-06-24

## 2025-06-24 VITALS
HEIGHT: 62 IN | DIASTOLIC BLOOD PRESSURE: 72 MMHG | HEART RATE: 80 BPM | SYSTOLIC BLOOD PRESSURE: 138 MMHG | RESPIRATION RATE: 18 BRPM | TEMPERATURE: 98.5 F | WEIGHT: 133 LBS | OXYGEN SATURATION: 98 % | BODY MASS INDEX: 24.48 KG/M2

## 2025-06-24 DIAGNOSIS — R30.0 DYSURIA: Primary | ICD-10-CM

## 2025-06-24 LAB
BILIRUBIN, POC: NEGATIVE
BLOOD URINE, POC: NORMAL
CLARITY, POC: CLEAR
COLOR, POC: YELLOW
GLUCOSE URINE, POC: NEGATIVE MG/DL
KETONES, POC: NEGATIVE MG/DL
LEUKOCYTE EST, POC: NORMAL
NITRITE, POC: NEGATIVE
PH, POC: 7
PROTEIN, POC: NEGATIVE MG/DL
SPECIFIC GRAVITY, POC: 1.01
UROBILINOGEN, POC: 0.2 MG/DL

## 2025-06-24 PROCEDURE — 1123F ACP DISCUSS/DSCN MKR DOCD: CPT | Performed by: PHYSICIAN ASSISTANT

## 2025-06-24 PROCEDURE — G8427 DOCREV CUR MEDS BY ELIG CLIN: HCPCS | Performed by: PHYSICIAN ASSISTANT

## 2025-06-24 PROCEDURE — G8420 CALC BMI NORM PARAMETERS: HCPCS | Performed by: PHYSICIAN ASSISTANT

## 2025-06-24 PROCEDURE — 3017F COLORECTAL CA SCREEN DOC REV: CPT | Performed by: PHYSICIAN ASSISTANT

## 2025-06-24 PROCEDURE — G8400 PT W/DXA NO RESULTS DOC: HCPCS | Performed by: PHYSICIAN ASSISTANT

## 2025-06-24 PROCEDURE — 99214 OFFICE O/P EST MOD 30 MIN: CPT | Performed by: PHYSICIAN ASSISTANT

## 2025-06-24 PROCEDURE — 81002 URINALYSIS NONAUTO W/O SCOPE: CPT | Performed by: PHYSICIAN ASSISTANT

## 2025-06-24 PROCEDURE — 1159F MED LIST DOCD IN RCRD: CPT | Performed by: PHYSICIAN ASSISTANT

## 2025-06-24 PROCEDURE — 1160F RVW MEDS BY RX/DR IN RCRD: CPT | Performed by: PHYSICIAN ASSISTANT

## 2025-06-24 PROCEDURE — 1036F TOBACCO NON-USER: CPT | Performed by: PHYSICIAN ASSISTANT

## 2025-06-24 PROCEDURE — 1090F PRES/ABSN URINE INCON ASSESS: CPT | Performed by: PHYSICIAN ASSISTANT

## 2025-06-24 NOTE — PROGRESS NOTES
calf tenderness, no pitting edema, symmetrical pulses noted bilaterally  Neurological: A&O x4, normal speech        Testing:     No results found for this visit on 06/24/25.         Component  Ref Range & Units (hover)  Resulting Agency   Specimen Description .CLEAN CATCH URINE [facility]   Special Requests Site: Urine [facility]   Culture ESCHERICHIA COLI >100,000 CFU/ML Abnormal  [facility]        Susceptibility    Escherichia coli (1)    Antibiotic Interpretation GIULIANA Method Status    ampicillin Sensitive 8 BACTERIAL SUSCEPTIBILITY PANEL GIULIANA Final    ceFAZolin Sensitive <=1 BACTERIAL SUSCEPTIBILITY PANEL GIULIANA Final     Cefazolin sensitivity results can be used to predict the effectiveness of oral  cephalosporins (eg. Cephalexin) in uncomplicated Urinary Tract Infections due to E. coli, K.   pneumoniae, and P. mirabilis       cefepime Sensitive <=0.12 BACTERIAL SUSCEPTIBILITY PANEL GIULIANA Final    cefOXitin Sensitive <=4 BACTERIAL SUSCEPTIBILITY PANEL GIULIANA Final    cefpodoxime proxetil Sensitive <=0.25 BACTERIAL SUSCEPTIBILITY PANEL GIULIANA Final    cefTRIAXone Sensitive <=0.25 BACTERIAL SUSCEPTIBILITY PANEL GIULIANA Final    ciprofloxacin Sensitive <=0.06 BACTERIAL SUSCEPTIBILITY PANEL GIULIANA Final    ertapenem Sensitive <=0.12 BACTERIAL SUSCEPTIBILITY PANEL GIULIANA Final    levofloxacin Sensitive <=0.12 BACTERIAL SUSCEPTIBILITY PANEL GIULIANA Final    meropenem Sensitive <=0.25 BACTERIAL SUSCEPTIBILITY PANEL GIULIANA Final    nitrofurantoin Sensitive <=16 BACTERIAL SUSCEPTIBILITY PANEL GIULIANA Final    piperacillin-tazobactam Sensitive <=4 BACTERIAL SUSCEPTIBILITY PANEL GIULIANA Final    tobramycin Sensitive <=1 BACTERIAL SUSCEPTIBILITY PANEL GIULIANA Final    trimethoprim-sulfamethoxazole Sensitive <=20 BACTERIAL SUSCEPTIBILITY PANEL GIULIANA Final         Specimen Collected: 03/15/25 16:45 EDT Last Resulted: 03/18/25 07:31 EDT     Medical Decision Making:     Vital signs reviewed    Past medical

## 2025-06-25 LAB
CULTURE: NORMAL
CULTURE: NORMAL
SPECIMEN DESCRIPTION: NORMAL

## 2025-07-08 ENCOUNTER — OFFICE VISIT (OUTPATIENT)
Dept: FAMILY MEDICINE CLINIC | Age: 75
End: 2025-07-08
Payer: MEDICARE

## 2025-07-08 ENCOUNTER — PROCEDURE VISIT (OUTPATIENT)
Dept: PODIATRY | Age: 75
End: 2025-07-08

## 2025-07-08 VITALS
DIASTOLIC BLOOD PRESSURE: 66 MMHG | WEIGHT: 134 LBS | TEMPERATURE: 97.8 F | BODY MASS INDEX: 24.5 KG/M2 | SYSTOLIC BLOOD PRESSURE: 136 MMHG

## 2025-07-08 VITALS
SYSTOLIC BLOOD PRESSURE: 132 MMHG | HEIGHT: 62 IN | DIASTOLIC BLOOD PRESSURE: 66 MMHG | OXYGEN SATURATION: 97 % | BODY MASS INDEX: 24.66 KG/M2 | WEIGHT: 134 LBS | TEMPERATURE: 98.1 F | RESPIRATION RATE: 18 BRPM | HEART RATE: 56 BPM

## 2025-07-08 DIAGNOSIS — L84 CORN OR CALLUS: Primary | ICD-10-CM

## 2025-07-08 DIAGNOSIS — M79.674 PAIN IN RIGHT TOE(S): ICD-10-CM

## 2025-07-08 DIAGNOSIS — B35.1 TINEA UNGUIUM: ICD-10-CM

## 2025-07-08 DIAGNOSIS — M79.604 RIGHT LEG PAIN: Primary | ICD-10-CM

## 2025-07-08 DIAGNOSIS — M79.675 PAIN IN LEFT TOE(S): ICD-10-CM

## 2025-07-08 DIAGNOSIS — M21.969 DEFORMITY OF METATARSAL, UNSPECIFIED LATERALITY: ICD-10-CM

## 2025-07-08 DIAGNOSIS — I73.9 PERIPHERAL VASCULAR DISEASE, UNSPECIFIED: ICD-10-CM

## 2025-07-08 DIAGNOSIS — R26.2 DIFFICULTY WALKING: ICD-10-CM

## 2025-07-08 DIAGNOSIS — B35.1 ONYCHOMYCOSIS: ICD-10-CM

## 2025-07-08 PROCEDURE — 1123F ACP DISCUSS/DSCN MKR DOCD: CPT | Performed by: PHYSICIAN ASSISTANT

## 2025-07-08 PROCEDURE — 3017F COLORECTAL CA SCREEN DOC REV: CPT | Performed by: PHYSICIAN ASSISTANT

## 2025-07-08 PROCEDURE — 1159F MED LIST DOCD IN RCRD: CPT | Performed by: PHYSICIAN ASSISTANT

## 2025-07-08 PROCEDURE — G8427 DOCREV CUR MEDS BY ELIG CLIN: HCPCS | Performed by: PHYSICIAN ASSISTANT

## 2025-07-08 PROCEDURE — 1036F TOBACCO NON-USER: CPT | Performed by: PHYSICIAN ASSISTANT

## 2025-07-08 PROCEDURE — G8400 PT W/DXA NO RESULTS DOC: HCPCS | Performed by: PHYSICIAN ASSISTANT

## 2025-07-08 PROCEDURE — G8420 CALC BMI NORM PARAMETERS: HCPCS | Performed by: PHYSICIAN ASSISTANT

## 2025-07-08 PROCEDURE — 1090F PRES/ABSN URINE INCON ASSESS: CPT | Performed by: PHYSICIAN ASSISTANT

## 2025-07-08 PROCEDURE — 99215 OFFICE O/P EST HI 40 MIN: CPT | Performed by: PHYSICIAN ASSISTANT

## 2025-07-08 NOTE — PROGRESS NOTES
25  Tamie Porter : 1950 Sex: female  Age 74 y.o.      Subjective:  Chief Complaint   Patient presents with    Leg Pain     right         HPI:     History of Present Illness  The patient is a 74-year-old female who presents complaining of right leg pain.    She has been experiencing intermittent severe pain in her right leg for the past few weeks, which she has been managing with ice and heat applications. The pain is localized behind the knee. She is uncertain if the cause is nerve-related or a potential blood clot. She reports no recent travel by air or car. She has no history of blood clots.    She has a history of breast cancer diagnosed in , which is currently in remission and not undergoing any treatment. She reports allergies to all fish products and indomethacin. She has surgery scheduled on 2025.            ROS:   Unless otherwise stated in this report the patient's positive and negative responses for review of systems for constitutional, eyes, ENT, cardiovascular, respiratory, gastrointestinal, neurological, , musculoskeletal, and integument systems and related systems to the presenting problem are either stated in the history of present illness or were not pertinent or were negative for the symptoms and/or complaints related to the presenting medical problem.  Positives and pertinent negatives as per HPI.  All others reviewed and are negative.      PMH:     Past Medical History:   Diagnosis Date    Cancer (HCC)     breast right       Past Surgical History:   Procedure Laterality Date    LYMPHADENECTOMY      right    MASTECTOMY, PARTIAL         History reviewed. No pertinent family history.    Medications:     Current Outpatient Medications   Medication Sig Dispense Refill    rosuvastatin (CRESTOR) 5 MG tablet Take 1 tablet by mouth daily      celecoxib (CELEBREX) 200 MG capsule Take 1 capsule by mouth 2 times daily      hydroCHLOROthiazide (HYDRODIURIL) 25 MG tablet TAKE 1

## 2025-07-08 NOTE — PROGRESS NOTES
See 1/29/24 encounter.    Encounter Closed     Diagnosis was discussed with the pt and all of their questions were answered in detail. Proper foot hygiene and care was discussed with the pt. Patient to check feet daily and contact the office with any questions/problems/concerns.  Other comorbidity noted and will be managed by PCP.  Pain waiver discussed with patient and confirmed.   7/8/2025      Electronically signed by Randy Mckeon DPM on 7/8/2025 at 8:11 AM  7/8/2025

## 2025-07-15 RX ORDER — LANOLIN ALCOHOL/MO/W.PET/CERES
1000 CREAM (GRAM) TOPICAL DAILY
COMMUNITY

## 2025-07-15 RX ORDER — MULTIVIT-MIN/FERROUS GLUCONATE 9 MG/15 ML
15 LIQUID (ML) ORAL DAILY
COMMUNITY

## 2025-07-15 RX ORDER — ACETAMINOPHEN 500 MG
500 TABLET ORAL EVERY 6 HOURS PRN
COMMUNITY

## 2025-07-15 RX ORDER — VITAMIN B COMPLEX
1 CAPSULE ORAL DAILY
COMMUNITY

## 2025-07-15 NOTE — PROGRESS NOTES
[facility] PRE-ADMISSION TESTING INSTRUCTIONS    The Preadmission Testing patient is instructed accordingly using the following criteria (check applicable):    ARRIVAL INSTRUCTIONS:  [x] Parking the day of Surgery is located in the Main Entrance lot.  Upon entering the door, make an immediate right to the surgery reception desk    [x] Bring photo ID and insurance card    [] Bring in a copy of Living will or Durable Power of  papers.    [x] Please be sure to arrange for responsible adult to provide transportation to and from the hospital    [x] Please arrange for responsible adult to be with you for the 24 hour period post procedure due to having anesthesia    [x] If you awake am of surgery not feeling well or have temperature >100 please call [phone]    GENERAL INSTRUCTIONS:    [x] May have clear liquids until 4 hours prior to surgery. Examples include water, fruit juices (no pulp), jello, popsicles, black coffee or tea, beef or chicken broth.               No gum, candy or mints.    [x] You may brush your teeth, but do not swallow any water    [x] Take medications as instructed with 1-2 oz of water    [x] Stop herbal supplements and vitamins 5 days prior to procedure. LD 7/2023    [x] Follow preop dosing of blood thinners per physician instructions. CELEBREX LAST DOSE 7/1/2025    [] Take 1/2 dose of evening insulin, but no insulin after midnight    [] No oral diabetic medications after midnight    [] If diabetic and have low blood sugar or feel symptomatic, take 1-2oz apple juice only    [] Bring inhalers day of surgery    [] Bring C-PAP/ Bi-Pap day of surgery    [] Bring urine specimen day of surgery    [x] Shower or bath with soap, lather and rinse well, AM of Surgery, no lotion, powders or creams to surgical site    [] Follow bowel prep as instructed per surgeon    [x] No tobacco products within 24 hours of surgery     [x] No alcohol or illegal drug use within 24 hours of

## 2025-07-18 ENCOUNTER — HOSPITAL ENCOUNTER (OUTPATIENT)
Dept: PREADMISSION TESTING | Age: 75
Discharge: HOME | End: 2025-07-18
Payer: MEDICARE

## 2025-07-18 LAB
ABO + RH BLD: NORMAL
ARM BAND NUMBER: NORMAL
BLOOD BANK SAMPLE EXPIRATION: NORMAL
BLOOD GROUP ANTIBODIES SERPL: NEGATIVE

## 2025-07-18 PROCEDURE — 86850 RBC ANTIBODY SCREEN: CPT

## 2025-07-18 PROCEDURE — 36415 COLL VENOUS BLD VENIPUNCTURE: CPT

## 2025-07-18 PROCEDURE — 86900 BLOOD TYPING SEROLOGIC ABO: CPT

## 2025-07-18 PROCEDURE — 86901 BLOOD TYPING SEROLOGIC RH(D): CPT

## 2025-07-20 ENCOUNTER — ANESTHESIA EVENT (OUTPATIENT)
Dept: OPERATING ROOM | Age: 75
DRG: 743 | End: 2025-07-20
Payer: MEDICARE

## 2025-07-21 ENCOUNTER — ANESTHESIA (OUTPATIENT)
Dept: OPERATING ROOM | Age: 75
DRG: 743 | End: 2025-07-21
Payer: MEDICARE

## 2025-07-21 ENCOUNTER — HOSPITAL ENCOUNTER (INPATIENT)
Age: 75
LOS: 1 days | Discharge: HOME | DRG: 743 | End: 2025-07-22
Attending: UROLOGY | Admitting: UROLOGY
Payer: MEDICARE

## 2025-07-21 DIAGNOSIS — N81.9 FEMALE GENITAL PROLAPSE, UNSPECIFIED TYPE: ICD-10-CM

## 2025-07-21 DIAGNOSIS — N81.6 RECTOCELE: ICD-10-CM

## 2025-07-21 DIAGNOSIS — N81.89 OLD LACERATION OF MUSCLES OF PELVIC FLOOR: ICD-10-CM

## 2025-07-21 DIAGNOSIS — G89.18 POST-OP PAIN: Primary | ICD-10-CM

## 2025-07-21 DIAGNOSIS — N81.10 PROLAPSE OF VAGINAL WALLS: ICD-10-CM

## 2025-07-21 PROBLEM — N81.4 UTERINE PROLAPSE: Status: ACTIVE | Noted: 2025-07-21

## 2025-07-21 PROCEDURE — 82365 CALCULUS SPECTROSCOPY: CPT

## 2025-07-21 PROCEDURE — C1758 CATHETER, URETERAL: HCPCS | Performed by: UROLOGY

## 2025-07-21 PROCEDURE — 2500000003 HC RX 250 WO HCPCS: Performed by: UROLOGY

## 2025-07-21 PROCEDURE — 2709999900 HC NON-CHARGEABLE SUPPLY: Performed by: UROLOGY

## 2025-07-21 PROCEDURE — S2900 ROBOTIC SURGICAL SYSTEM: HCPCS | Performed by: UROLOGY

## 2025-07-21 PROCEDURE — C1781 MESH (IMPLANTABLE): HCPCS | Performed by: UROLOGY

## 2025-07-21 PROCEDURE — 6360000002 HC RX W HCPCS: Performed by: UROLOGY

## 2025-07-21 PROCEDURE — 2500000003 HC RX 250 WO HCPCS

## 2025-07-21 PROCEDURE — 0JQC3ZZ REPAIR PELVIC REGION SUBCUTANEOUS TISSUE AND FASCIA, PERCUTANEOUS APPROACH: ICD-10-PCS | Performed by: OBSTETRICS & GYNECOLOGY

## 2025-07-21 PROCEDURE — 1200000000 HC SEMI PRIVATE

## 2025-07-21 PROCEDURE — 8E0W4CZ ROBOTIC ASSISTED PROCEDURE OF TRUNK REGION, PERCUTANEOUS ENDOSCOPIC APPROACH: ICD-10-PCS | Performed by: OBSTETRICS & GYNECOLOGY

## 2025-07-21 PROCEDURE — 58542 LSH W/T/O UT 250 G OR LESS: CPT | Performed by: PHYSICIAN ASSISTANT

## 2025-07-21 PROCEDURE — 88305 TISSUE EXAM BY PATHOLOGIST: CPT

## 2025-07-21 PROCEDURE — 0UT24ZZ RESECTION OF BILATERAL OVARIES, PERCUTANEOUS ENDOSCOPIC APPROACH: ICD-10-PCS | Performed by: OBSTETRICS & GYNECOLOGY

## 2025-07-21 PROCEDURE — 6370000000 HC RX 637 (ALT 250 FOR IP): Performed by: UROLOGY

## 2025-07-21 PROCEDURE — 2720000010 HC SURG SUPPLY STERILE: Performed by: UROLOGY

## 2025-07-21 PROCEDURE — 7100000001 HC PACU RECOVERY - ADDTL 15 MIN: Performed by: UROLOGY

## 2025-07-21 PROCEDURE — C1769 GUIDE WIRE: HCPCS | Performed by: UROLOGY

## 2025-07-21 PROCEDURE — 3700000001 HC ADD 15 MINUTES (ANESTHESIA): Performed by: UROLOGY

## 2025-07-21 PROCEDURE — 3600000009 HC SURGERY ROBOT BASE: Performed by: UROLOGY

## 2025-07-21 PROCEDURE — 0TJB8ZZ INSPECTION OF BLADDER, VIA NATURAL OR ARTIFICIAL OPENING ENDOSCOPIC: ICD-10-PCS | Performed by: OBSTETRICS & GYNECOLOGY

## 2025-07-21 PROCEDURE — 2580000003 HC RX 258: Performed by: UROLOGY

## 2025-07-21 PROCEDURE — 2500000003 HC RX 250 WO HCPCS: Performed by: ANESTHESIOLOGY

## 2025-07-21 PROCEDURE — 6360000002 HC RX W HCPCS

## 2025-07-21 PROCEDURE — 88300 SURGICAL PATH GROSS: CPT

## 2025-07-21 PROCEDURE — 0UT94ZL RESECTION OF UTERUS, SUPRACERVICAL, PERCUTANEOUS ENDOSCOPIC APPROACH: ICD-10-PCS | Performed by: OBSTETRICS & GYNECOLOGY

## 2025-07-21 PROCEDURE — 0UT74ZZ RESECTION OF BILATERAL FALLOPIAN TUBES, PERCUTANEOUS ENDOSCOPIC APPROACH: ICD-10-PCS | Performed by: OBSTETRICS & GYNECOLOGY

## 2025-07-21 PROCEDURE — 3700000000 HC ANESTHESIA ATTENDED CARE: Performed by: UROLOGY

## 2025-07-21 PROCEDURE — 3600000019 HC SURGERY ROBOT ADDTL 15MIN: Performed by: UROLOGY

## 2025-07-21 PROCEDURE — 0USG4ZZ REPOSITION VAGINA, PERCUTANEOUS ENDOSCOPIC APPROACH: ICD-10-PCS | Performed by: OBSTETRICS & GYNECOLOGY

## 2025-07-21 PROCEDURE — 7100000000 HC PACU RECOVERY - FIRST 15 MIN: Performed by: UROLOGY

## 2025-07-21 PROCEDURE — 6370000000 HC RX 637 (ALT 250 FOR IP): Performed by: OPHTHALMOLOGY

## 2025-07-21 PROCEDURE — 6360000002 HC RX W HCPCS: Performed by: ANESTHESIOLOGY

## 2025-07-21 DEVICE — TRADITIONAL Y MESH
Type: IMPLANTABLE DEVICE | Site: ABDOMEN | Status: FUNCTIONAL
Brand: UPSYLON™

## 2025-07-21 RX ORDER — SODIUM CHLORIDE 9 MG/ML
INJECTION, SOLUTION INTRAVENOUS PRN
Status: DISCONTINUED | OUTPATIENT
Start: 2025-07-21 | End: 2025-07-21 | Stop reason: HOSPADM

## 2025-07-21 RX ORDER — SODIUM CHLORIDE 0.9 % (FLUSH) 0.9 %
5-40 SYRINGE (ML) INJECTION PRN
Status: DISCONTINUED | OUTPATIENT
Start: 2025-07-21 | End: 2025-07-21 | Stop reason: HOSPADM

## 2025-07-21 RX ORDER — PROPOFOL 10 MG/ML
INJECTION, EMULSION INTRAVENOUS
Status: DISCONTINUED | OUTPATIENT
Start: 2025-07-21 | End: 2025-07-21 | Stop reason: SDUPTHER

## 2025-07-21 RX ORDER — ROSUVASTATIN CALCIUM 5 MG/1
5 TABLET, COATED ORAL DAILY
Status: DISCONTINUED | OUTPATIENT
Start: 2025-07-21 | End: 2025-07-21

## 2025-07-21 RX ORDER — SODIUM CHLORIDE 0.9 % (FLUSH) 0.9 %
5-40 SYRINGE (ML) INJECTION EVERY 12 HOURS SCHEDULED
Status: DISCONTINUED | OUTPATIENT
Start: 2025-07-21 | End: 2025-07-21 | Stop reason: HOSPADM

## 2025-07-21 RX ORDER — ONDANSETRON 2 MG/ML
INJECTION INTRAMUSCULAR; INTRAVENOUS
Status: DISCONTINUED | OUTPATIENT
Start: 2025-07-21 | End: 2025-07-21 | Stop reason: SDUPTHER

## 2025-07-21 RX ORDER — ROCURONIUM BROMIDE 10 MG/ML
INJECTION, SOLUTION INTRAVENOUS
Status: DISCONTINUED | OUTPATIENT
Start: 2025-07-21 | End: 2025-07-21 | Stop reason: SDUPTHER

## 2025-07-21 RX ORDER — DEXTROSE MONOHYDRATE, SODIUM CHLORIDE, AND POTASSIUM CHLORIDE 50; 1.49; 4.5 G/1000ML; G/1000ML; G/1000ML
INJECTION, SOLUTION INTRAVENOUS CONTINUOUS
Status: DISCONTINUED | OUTPATIENT
Start: 2025-07-21 | End: 2025-07-22 | Stop reason: HOSPADM

## 2025-07-21 RX ORDER — FENTANYL CITRATE 50 UG/ML
25 INJECTION, SOLUTION INTRAMUSCULAR; INTRAVENOUS EVERY 5 MIN PRN
Status: COMPLETED | OUTPATIENT
Start: 2025-07-21 | End: 2025-07-21

## 2025-07-21 RX ORDER — NEOMYCIN/BACITRACIN/POLYMYXINB 3.5-400-5K
OINTMENT (GRAM) TOPICAL EVERY 8 HOURS
Status: DISCONTINUED | OUTPATIENT
Start: 2025-07-21 | End: 2025-07-21

## 2025-07-21 RX ORDER — BUPIVACAINE HYDROCHLORIDE 5 MG/ML
INJECTION, SOLUTION EPIDURAL; INTRACAUDAL; PERINEURAL PRN
Status: DISCONTINUED | OUTPATIENT
Start: 2025-07-21 | End: 2025-07-21 | Stop reason: ALTCHOICE

## 2025-07-21 RX ORDER — ROSUVASTATIN CALCIUM 5 MG/1
5 TABLET, COATED ORAL NIGHTLY
Status: DISCONTINUED | OUTPATIENT
Start: 2025-07-21 | End: 2025-07-22 | Stop reason: HOSPADM

## 2025-07-21 RX ORDER — CEFAZOLIN SODIUM 1 G/3ML
INJECTION, POWDER, FOR SOLUTION INTRAMUSCULAR; INTRAVENOUS PRN
Status: DISCONTINUED | OUTPATIENT
Start: 2025-07-21 | End: 2025-07-21 | Stop reason: ALTCHOICE

## 2025-07-21 RX ORDER — ACETAMINOPHEN 500 MG
500 TABLET ORAL EVERY 6 HOURS PRN
Status: DISCONTINUED | OUTPATIENT
Start: 2025-07-21 | End: 2025-07-22 | Stop reason: HOSPADM

## 2025-07-21 RX ORDER — SODIUM CHLORIDE 0.9 % (FLUSH) 0.9 %
5-40 SYRINGE (ML) INJECTION EVERY 12 HOURS SCHEDULED
Status: DISCONTINUED | OUTPATIENT
Start: 2025-07-21 | End: 2025-07-22 | Stop reason: HOSPADM

## 2025-07-21 RX ORDER — PROCHLORPERAZINE EDISYLATE 5 MG/ML
5 INJECTION INTRAMUSCULAR; INTRAVENOUS
Status: DISCONTINUED | OUTPATIENT
Start: 2025-07-21 | End: 2025-07-21 | Stop reason: HOSPADM

## 2025-07-21 RX ORDER — MULTIVITAMIN WITH IRON
1 TABLET ORAL DAILY
Status: DISCONTINUED | OUTPATIENT
Start: 2025-07-21 | End: 2025-07-22 | Stop reason: HOSPADM

## 2025-07-21 RX ORDER — EPHEDRINE SULFATE/0.9% NACL/PF 25 MG/5 ML
SYRINGE (ML) INTRAVENOUS
Status: DISCONTINUED | OUTPATIENT
Start: 2025-07-21 | End: 2025-07-21 | Stop reason: SDUPTHER

## 2025-07-21 RX ORDER — DEXAMETHASONE SODIUM PHOSPHATE 4 MG/ML
INJECTION, SOLUTION INTRA-ARTICULAR; INTRALESIONAL; INTRAMUSCULAR; INTRAVENOUS; SOFT TISSUE
Status: DISCONTINUED | OUTPATIENT
Start: 2025-07-21 | End: 2025-07-21 | Stop reason: SDUPTHER

## 2025-07-21 RX ORDER — SODIUM CHLORIDE 0.9 % (FLUSH) 0.9 %
5-40 SYRINGE (ML) INJECTION PRN
Status: DISCONTINUED | OUTPATIENT
Start: 2025-07-21 | End: 2025-07-22 | Stop reason: HOSPADM

## 2025-07-21 RX ORDER — SODIUM CHLORIDE 9 MG/ML
INJECTION, SOLUTION INTRAVENOUS PRN
Status: DISCONTINUED | OUTPATIENT
Start: 2025-07-21 | End: 2025-07-22 | Stop reason: HOSPADM

## 2025-07-21 RX ORDER — NEOMYCIN SULFATE, POLYMYXIN B SULFATE AND BACITRACIN ZINC 3.5; 10000; 4 MG/G; [USP'U]/G; [USP'U]/G
OINTMENT OPHTHALMIC 3 TIMES DAILY
Status: DISCONTINUED | OUTPATIENT
Start: 2025-07-21 | End: 2025-07-22 | Stop reason: HOSPADM

## 2025-07-21 RX ORDER — CELECOXIB 100 MG/1
200 CAPSULE ORAL 2 TIMES DAILY
Status: DISCONTINUED | OUTPATIENT
Start: 2025-07-21 | End: 2025-07-22 | Stop reason: HOSPADM

## 2025-07-21 RX ORDER — HYDROCHLOROTHIAZIDE 25 MG/1
25 TABLET ORAL DAILY
Status: DISCONTINUED | OUTPATIENT
Start: 2025-07-21 | End: 2025-07-22 | Stop reason: HOSPADM

## 2025-07-21 RX ORDER — LIDOCAINE HYDROCHLORIDE 20 MG/ML
INJECTION, SOLUTION EPIDURAL; INFILTRATION; INTRACAUDAL; PERINEURAL
Status: DISCONTINUED | OUTPATIENT
Start: 2025-07-21 | End: 2025-07-21 | Stop reason: SDUPTHER

## 2025-07-21 RX ORDER — NEOMYCIN/BACITRACIN/POLYMYXINB 3.5-400-5K
OINTMENT (GRAM) TOPICAL 3 TIMES DAILY
Status: DISCONTINUED | OUTPATIENT
Start: 2025-07-21 | End: 2025-07-21 | Stop reason: SDUPTHER

## 2025-07-21 RX ORDER — LANOLIN ALCOHOL/MO/W.PET/CERES
1000 CREAM (GRAM) TOPICAL DAILY
Status: DISCONTINUED | OUTPATIENT
Start: 2025-07-21 | End: 2025-07-22 | Stop reason: HOSPADM

## 2025-07-21 RX ORDER — ONDANSETRON 2 MG/ML
4 INJECTION INTRAMUSCULAR; INTRAVENOUS EVERY 6 HOURS PRN
Status: DISCONTINUED | OUTPATIENT
Start: 2025-07-21 | End: 2025-07-22 | Stop reason: HOSPADM

## 2025-07-21 RX ORDER — VERAPAMIL HYDROCHLORIDE 180 MG/1
90 TABLET, FILM COATED, EXTENDED RELEASE ORAL NIGHTLY
Status: DISCONTINUED | OUTPATIENT
Start: 2025-07-21 | End: 2025-07-22 | Stop reason: HOSPADM

## 2025-07-21 RX ORDER — FENTANYL CITRATE 50 UG/ML
INJECTION, SOLUTION INTRAMUSCULAR; INTRAVENOUS
Status: DISCONTINUED | OUTPATIENT
Start: 2025-07-21 | End: 2025-07-21 | Stop reason: SDUPTHER

## 2025-07-21 RX ORDER — MULTIVIT WITH IRON,MINERALS
15 LIQUID (ML) ORAL DAILY
Status: DISCONTINUED | OUTPATIENT
Start: 2025-07-21 | End: 2025-07-22 | Stop reason: HOSPADM

## 2025-07-21 RX ORDER — MIDAZOLAM HYDROCHLORIDE 1 MG/ML
INJECTION, SOLUTION INTRAMUSCULAR; INTRAVENOUS
Status: DISCONTINUED | OUTPATIENT
Start: 2025-07-21 | End: 2025-07-21 | Stop reason: SDUPTHER

## 2025-07-21 RX ADMIN — ACETAMINOPHEN 500 MG: 500 TABLET ORAL at 21:19

## 2025-07-21 RX ADMIN — SODIUM CHLORIDE: 9 INJECTION, SOLUTION INTRAVENOUS at 07:00

## 2025-07-21 RX ADMIN — ONDANSETRON 4 MG: 2 INJECTION, SOLUTION INTRAMUSCULAR; INTRAVENOUS at 09:25

## 2025-07-21 RX ADMIN — PROMETHAZINE HYDROCHLORIDE 12.5 MG: 25 INJECTION INTRAMUSCULAR; INTRAVENOUS at 21:54

## 2025-07-21 RX ADMIN — DEXTROSE MONOHYDRATE, SODIUM CHLORIDE, AND POTASSIUM CHLORIDE: 50; 4.5; 1.49 INJECTION, SOLUTION INTRAVENOUS at 23:44

## 2025-07-21 RX ADMIN — FENTANYL CITRATE 25 MCG: 0.05 INJECTION, SOLUTION INTRAMUSCULAR; INTRAVENOUS at 10:29

## 2025-07-21 RX ADMIN — WATER 1000 MG: 1 INJECTION INTRAMUSCULAR; INTRAVENOUS; SUBCUTANEOUS at 16:00

## 2025-07-21 RX ADMIN — ROCURONIUM BROMIDE 50 MG: 10 INJECTION, SOLUTION INTRAVENOUS at 07:08

## 2025-07-21 RX ADMIN — LISINOPRIL 30 MG: 20 TABLET ORAL at 16:01

## 2025-07-21 RX ADMIN — FENTANYL CITRATE 25 MCG: 0.05 INJECTION, SOLUTION INTRAMUSCULAR; INTRAVENOUS at 10:21

## 2025-07-21 RX ADMIN — FENTANYL CITRATE 100 MCG: 50 INJECTION, SOLUTION INTRAMUSCULAR; INTRAVENOUS at 07:08

## 2025-07-21 RX ADMIN — WATER 2000 MG: 1 INJECTION INTRAMUSCULAR; INTRAVENOUS; SUBCUTANEOUS at 07:15

## 2025-07-21 RX ADMIN — SODIUM CHLORIDE, PRESERVATIVE FREE 5 ML: 5 INJECTION INTRAVENOUS at 11:20

## 2025-07-21 RX ADMIN — FENTANYL CITRATE 50 MCG: 50 INJECTION, SOLUTION INTRAMUSCULAR; INTRAVENOUS at 07:34

## 2025-07-21 RX ADMIN — CYANOCOBALAMIN TAB 1000 MCG 1000 MCG: 1000 TAB at 16:01

## 2025-07-21 RX ADMIN — FENTANYL CITRATE 50 MCG: 50 INJECTION, SOLUTION INTRAMUSCULAR; INTRAVENOUS at 09:28

## 2025-07-21 RX ADMIN — WATER 1000 MG: 1 INJECTION INTRAMUSCULAR; INTRAVENOUS; SUBCUTANEOUS at 22:48

## 2025-07-21 RX ADMIN — HYDROCHLOROTHIAZIDE 25 MG: 25 TABLET ORAL at 16:01

## 2025-07-21 RX ADMIN — LIDOCAINE HYDROCHLORIDE 60 MG: 20 INJECTION, SOLUTION EPIDURAL; INFILTRATION; INTRACAUDAL; PERINEURAL at 07:08

## 2025-07-21 RX ADMIN — MIDAZOLAM 1 MG: 1 INJECTION INTRAMUSCULAR; INTRAVENOUS at 07:00

## 2025-07-21 RX ADMIN — SUGAMMADEX 200 MG: 100 INJECTION, SOLUTION INTRAVENOUS at 09:27

## 2025-07-21 RX ADMIN — SODIUM CHLORIDE: 9 INJECTION, SOLUTION INTRAVENOUS at 08:04

## 2025-07-21 RX ADMIN — NEOMYCIN SULFATE, POLYMYXIN B SULFATE AND BACITRACIN ZINC: 3.5; 10000; 4 OINTMENT OPHTHALMIC at 21:12

## 2025-07-21 RX ADMIN — Medication 15 ML: at 16:01

## 2025-07-21 RX ADMIN — DEXTROSE MONOHYDRATE, SODIUM CHLORIDE, AND POTASSIUM CHLORIDE: 50; 4.5; 1.49 INJECTION, SOLUTION INTRAVENOUS at 13:09

## 2025-07-21 RX ADMIN — ONDANSETRON 4 MG: 2 INJECTION, SOLUTION INTRAMUSCULAR; INTRAVENOUS at 19:20

## 2025-07-21 RX ADMIN — Medication 1 TABLET: at 16:01

## 2025-07-21 RX ADMIN — PROPOFOL 150 MG: 10 INJECTION, EMULSION INTRAVENOUS at 07:08

## 2025-07-21 RX ADMIN — FENTANYL CITRATE 50 MCG: 50 INJECTION, SOLUTION INTRAMUSCULAR; INTRAVENOUS at 08:52

## 2025-07-21 RX ADMIN — DEXAMETHASONE SODIUM PHOSPHATE 10 MG: 4 INJECTION, SOLUTION INTRAMUSCULAR; INTRAVENOUS at 07:16

## 2025-07-21 RX ADMIN — HYDROMORPHONE HYDROCHLORIDE 0.5 MG: 1 INJECTION, SOLUTION INTRAMUSCULAR; INTRAVENOUS; SUBCUTANEOUS at 10:21

## 2025-07-21 RX ADMIN — EPHEDRINE SULFATE 5 MG: 5 INJECTION INTRAVENOUS at 07:28

## 2025-07-21 ASSESSMENT — PAIN DESCRIPTION - LOCATION
LOCATION: ABDOMEN;VAGINA
LOCATION: ABDOMEN
LOCATION: ABDOMEN

## 2025-07-21 ASSESSMENT — PAIN - FUNCTIONAL ASSESSMENT
PAIN_FUNCTIONAL_ASSESSMENT: 0-10

## 2025-07-21 ASSESSMENT — PAIN DESCRIPTION - DESCRIPTORS
DESCRIPTORS: ACHING
DESCRIPTORS: ACHING;DISCOMFORT;CRAMPING;BURNING;SORE;TENDER
DESCRIPTORS: ACHING;BURNING;DISCOMFORT;CRAMPING;SORE;TENDER

## 2025-07-21 ASSESSMENT — PAIN SCALES - GENERAL
PAINLEVEL_OUTOF10: 4
PAINLEVEL_OUTOF10: 5
PAINLEVEL_OUTOF10: 10

## 2025-07-21 NOTE — ANESTHESIA PRE PROCEDURE
Department of Anesthesiology  Preprocedure Note       Name:  Tamie Porter   Age:  74 y.o.  :  1950                                          MRN:  [id number]         Date:  2025      Surgeon: Surgeon(s):  Wes Donahue DO Quirk, William M, MD    Procedure: Procedure(s):  CYSTOSCOPY RIGHT EXTERNAL URETERAL CATHETER INSERTION LAPAROSCOPIC ROBOTIC XI ASSISTED ABDOMINAL SACRAL COLPOPEXY WITH Y MESH  LAPAROSCOPIC ROBOTIC XI ASSISTED SUPRACERVICAL HYSTERECTOMY BILATERAL SALPINGO OOPHORECTOMY POSSIBLE POSTERIOR COLPORRHAPHY POSSIBLE LEVATOR MYORRHAPHY    Medications prior to admission:   Prior to Admission medications    Medication Sig Start Date End Date Taking? Authorizing Provider   acetaminophen (TYLENOL) 500 MG tablet Take 1 tablet by mouth every 6 hours as needed for Pain   Yes Madelyn Metcalf MD   vitamin B-12 (CYANOCOBALAMIN) 1000 MCG tablet Take 1 tablet by mouth daily   Yes Madelyn Metcalf MD   b complex vitamins capsule Take 1 capsule by mouth daily   Yes Madelyn Metcalf MD   Multiple Vitamins-Minerals (CENTRUM/CERTA-OCTAVIA WITH MINERALS ORAL) solution Take 15 mLs by mouth daily   Yes ProviderMadelyn MD   rosuvastatin (CRESTOR) 5 MG tablet Take 1 tablet by mouth daily   Yes ProviderMadelyn MD   celecoxib (CELEBREX) 200 MG capsule Take 1 capsule by mouth 2 times daily   Yes Madelyn Metcalf MD   hydroCHLOROthiazide (HYDRODIURIL) 25 MG tablet TAKE 1 TABLET BY MOUTH EVERY DAY 20  Yes Madelyn Metcalf MD   lisinopril (PRINIVIL;ZESTRIL) 20 MG tablet 1.5 tablets 21  Yes Madelyn Metcalf MD   Cholecalciferol 100 MCG (4000 UT) CAPS Take  by mouth daily.   Yes ProviderMadelyn MD   verapamil (CALAN SR) 180 MG extended release tablet Take 0.5 tablets by mouth nightly   Yes ProviderMadelyn MD       Current medications:    Current Facility-Administered Medications   Medication Dose Route Frequency Provider Last Rate Last Admin    sodium chloride

## 2025-07-21 NOTE — DISCHARGE INSTRUCTIONS
Dr Fenton's Discharge Instructions  General  No heavy lifting  No driving for 1-2 weeks  No douching, intercourse or tampons until final post-op visit  May take shower, dry incisions and leave open to air afterward  May walk and climb stairs as tolerated    Follow-up   Continue stool softener, anti-gas med or laxative as necessary  Use Tylenol or Advil for baseline pain meds  Call for appointment for incision check for 10-14 days after your surgery  Call for fever, chills, redness or warmness of your incision, worsening abd pain, protracted nausea or vomiting, or any other concerns      Follow up Dr. Wes Donahue in 1-4 week, [phone]    When you are discharged:    It is okay to climb stairs  Its  take a shower in 24 hours.  No heavy lifting greater than 5-10 pounds.   Call for pain or fever.    You will need to call the office to make an appointment to see Dr. Donahue/Lora/Stormy/Abdiaziz in 10-14 days.    Prescriptions were sent in.   Hold on Driving until cleared by the physician.    Any questions or concerns arise call the office, [phone].

## 2025-07-21 NOTE — PROCEDURES
Operative Note    Patient:  Tamie Porter     Procedure Date:  7/21/2025  Medical Record Number:  [id number]    Pre-op Dx:    1. Uterine procidentia            Post-op Dx:   1. Same    Procedure:    1. Robotic-assisted supracervical hysterectomy with bilateral salpingo-oophorectomy          Surgeon:  Ted Fenton MD         1st Assist:  JOVAN Weiss PA-C, whose presence was necessary throughout the procedure for assistance with uterine manipulation in the absence of a qualified resident             Anesthesia:  General  IV Fluids:  IV crystalloid     Comps:  None        EBL: 5cc    Drains:  Venegas    Findings:  Complete uterine procidentia.  Bilobed serous cyst of right ovary    Condition:  Stable    Disposition:  Tolerated procedure well     Dictation Number:  [id number]

## 2025-07-21 NOTE — ANESTHESIA PRE PROCEDURE
Department of Anesthesiology  Preprocedure Note       Name:  Tamie Porter   Age:  74 y.o.  :  1950                                          MRN:  [id number]         Date:  2025      Surgeon: Surgeon(s):  Wes Donahue DO Quirk, William M, MD    Procedure: Procedure(s):  CYSTOSCOPY RIGHT EXTERNAL URETERAL CATHETER INSERTION LAPAROSCOPIC ROBOTIC XI ASSISTED ABDOMINAL SACRAL COLPOPEXY WITH Y MESH  LAPAROSCOPIC ROBOTIC XI ASSISTED SUPRACERVICAL HYSTERECTOMY BILATERAL SALPINGO OOPHORECTOMY POSSIBLE POSTERIOR COLPORRHAPHY POSSIBLE LEVATOR MYORRHAPHY    Medications prior to admission:   Prior to Admission medications    Medication Sig Start Date End Date Taking? Authorizing Provider   acetaminophen (TYLENOL) 500 MG tablet Take 1 tablet by mouth every 6 hours as needed for Pain   Yes Madelyn Metcalf MD   vitamin B-12 (CYANOCOBALAMIN) 1000 MCG tablet Take 1 tablet by mouth daily   Yes Madelyn Metcalf MD   b complex vitamins capsule Take 1 capsule by mouth daily   Yes Madelyn Metcalf MD   Multiple Vitamins-Minerals (CENTRUM/CERTA-OCTAVIA WITH MINERALS ORAL) solution Take 15 mLs by mouth daily   Yes Madelyn Metcalf MD   Cholecalciferol 100 MCG (4000 UT) CAPS Take  by mouth daily.   Yes Madelyn Metcalf MD   rosuvastatin (CRESTOR) 5 MG tablet Take 1 tablet by mouth daily    Madelyn Metcalf MD   celecoxib (CELEBREX) 200 MG capsule Take 1 capsule by mouth 2 times daily    Madelyn Metcalf MD   hydroCHLOROthiazide (HYDRODIURIL) 25 MG tablet TAKE 1 TABLET BY MOUTH EVERY DAY 20   Madelyn Metcalf MD   lisinopril (PRINIVIL;ZESTRIL) 20 MG tablet 1.5 tablets 21   Madelyn Metcalf MD   verapamil (CALAN SR) 180 MG extended release tablet Take 0.5 tablets by mouth nightly    Madelyn Metcalf MD       Current medications:    Current Facility-Administered Medications   Medication Dose Route Frequency Provider Last Rate Last Admin    sodium chloride flush 0.9 %

## 2025-07-21 NOTE — H&P
7/21/2025 7:01 AM  Service: Urology  Group: [facility] (Godfrey/Lora/Stormy)    Tamie Porter  [id number]     Chief Complaint: stage 4 uterine prolapse     History of Present Illness:  The patient is a 74 y.o. female patient who presents with the above  The risk of the surgery was discussed at length.  The risk of the anesthesia and loss of airway.  Cardiovascular risks, myocardial infraction, cerebral vascular accident, pulmonary embolism, deep vein thrombosis.  Injury risk, bowel injury, bladder injury, ureteral injury, blood vessel injury, delayed presentation of the injury (ie scar tissue or stricture formation).  These can lead to bleeding, requiring transfusion.  He risk of infection with can lead to sepsis.  The risk of death.  The may be other issues that present that are not listed above which can occur.  The patient understood these risks, they understood the benefits, they understood the alternatives and fully consent to proceed.  The needs for a second procedure may also be required.      Past Medical History:   Diagnosis Date    Cancer (HCC)     breast right    Diastasis recti     PONV (postoperative nausea and vomiting)     Retention of urine        Past Surgical History:   Procedure Laterality Date    LYMPHADENECTOMY      right    MASTECTOMY, PARTIAL Right 2005       Medications Prior to Admission:    Medications Prior to Admission: acetaminophen (TYLENOL) 500 MG tablet, Take 1 tablet by mouth every 6 hours as needed for Pain  vitamin B-12 (CYANOCOBALAMIN) 1000 MCG tablet, Take 1 tablet by mouth daily  b complex vitamins capsule, Take 1 capsule by mouth daily  Multiple Vitamins-Minerals (CENTRUM/CERTA-OCTAVIA WITH MINERALS ORAL) solution, Take 15 mLs by mouth daily  rosuvastatin (CRESTOR) 5 MG tablet, Take 1 tablet by mouth daily  celecoxib (CELEBREX) 200 MG capsule, Take 1 capsule by mouth 2 times daily  hydroCHLOROthiazide (HYDRODIURIL) 25 MG tablet, TAKE 1 TABLET BY MOUTH EVERY DAY  lisinopril

## 2025-07-21 NOTE — OP NOTE
[place] [address]                            OPERATIVE REPORT      PATIENT NAME: PEARL GONSALES              : 1950  MED REC NO: [id number]                        ROOM: OR POOL  ACCOUNT NO: [id number]                       ADMIT DATE: 2025  PROVIDER: Ted Fenton MD      DATE OF PROCEDURE:  2025    SURGEON:  Ted Fenton MD    PREOPERATIVE DIAGNOSIS:  Uterine procidentia.    POSTOPERATIVE DIAGNOSIS:  Uterine procidentia.    PROCEDURE:  Robotic-assisted supracervical hysterectomy with bilateral salpingo-oophorectomy.    FIRST ASSISTANT:  Meredith Weiss PA-C, whose presence was necessary throughout the procedure for assistance with uterine manipulation in the absence of qualified resident.    ANESTHESIA:  General.    FLUIDS:  IV crystalloid.    COMPLICATIONS:  None.    ESTIMATED BLOOD LOSS:  5 mL.    DRAINS:  Venegas.    FINDINGS:  Complete uterine procidentia and a bilobed serous cyst of the right ovary.    CONDITION:  Stable.    DESCRIPTION OF PROCEDURE:  The patient was brought to the robotic OR, prepped and draped in usual fashion on the [facility] Xi table.  This surgery was done in conjunction with Dr. Donahue who initially performed cysto, stenting, removal of stone, and placement of the trocars.  Once the robot was docked, I assumed the controls with a vessel sealer and [place] forceps.  There were noted to be some filmy adhesions of the colon to the left pelvic sidewall, which were sharply taken down with the uterine manipulator placed vaginally.  A supracervical hysterectomy was undertaken beginning on the right, cauterizing the infundibulopelvic ligament in 3 contiguous places and transecting, carried down through the broad ligament and round ligament, the anterior leaf of which was extended in an inferior medial fashion creating a bladder flap.  The vessels at the cervicoisthmic junction were

## 2025-07-21 NOTE — PROGRESS NOTES
Patients pain is improving and now rates 5/10 down from 10/10. She does have c/o of chest abdomen and vaginal discomfort. When asked if she had any SOB, radiating pain she denies. Normal VS, and EKG findings. Anesthesia notified and she believes this to all be relative to the procedure. No additional orders given or needed at this time.

## 2025-07-21 NOTE — PLAN OF CARE
Problem: Discharge Planning  Goal: Discharge to home or other facility with appropriate resources  Outcome: Progressing     Problem: Pain  Goal: Verbalizes/displays adequate comfort level or baseline comfort level  Outcome: Progressing     Problem: Safety - Adult  Goal: Free from fall injury  Outcome: Progressing     Problem: Skin/Tissue Integrity  Goal: Skin integrity remains intact  Description: 1.  Monitor for areas of redness and/or skin breakdown  2.  Assess vascular access sites hourly  3.  Every 4-6 hours minimum:  Change oxygen saturation probe site  4.  Every 4-6 hours:  If on nasal continuous positive airway pressure, respiratory therapy assess nares and determine need for appliance change or resting period  Outcome: Progressing     Problem: ABCDS Injury Assessment  Goal: Absence of physical injury  Outcome: Progressing     Problem: Gastrointestinal - Adult  Goal: Minimal or absence of nausea and vomiting  Outcome: Progressing  Goal: Maintains or returns to baseline bowel function  Outcome: Progressing  Goal: Maintains adequate nutritional intake  Outcome: Progressing  Goal: Establish and maintain optimal ostomy function  Outcome: Progressing     Problem: Genitourinary - Adult  Goal: Absence of urinary retention  Outcome: Progressing  Goal: Urinary catheter remains patent  Outcome: Progressing

## 2025-07-21 NOTE — PROGRESS NOTES
4 Eyes Skin Assessment     NAME:  Tamie Porter  YOB: 1950  MEDICAL RECORD NUMBER:  [id number]    The patient is being assessed for  Admission    I agree that at least one RN has performed a thorough Head to Toe Skin Assessment on the patient. ALL assessment sites listed below have been assessed.      Areas assessed by both nurses:    Head, Face, Ears, Shoulders, Back, Chest, Arms, Elbows, Hands, Sacrum. Buttock, Coccyx, Ischium, Legs. Feet and Heels, and Under Medical Devices         Does the Patient have a Wound? No noted wound(s)       Darnell Prevention initiated by RN: No  Wound Care Orders initiated by RN: No    For hospital-acquired stage 1 & 2 and ALL Stage 3,4, Unstageable, DTI, NWPT, and Complex wounds: place order “IP Wound Care/Ostomy Nurse Eval and Treat” by RN under : NA    New Ostomies, if present place, Ostomy referral order under : No     Nurse 1 eSignature: Electronically signed by Zoë Harris RN on 7/21/25 at 6:44 PM EDT    **SHARE this note so that the co-signing nurse can place an eSignature**    Nurse 2 eSignature: Electronically signed by Mega Simpson RN on 7/21/25 at 6:48 PM EDT

## 2025-07-21 NOTE — PROGRESS NOTES
Spoke with Dr. Gil about patients left eye pain. After explaining the situation, she stated it was an abrasion to the left eye  after anesthesia. See new orders.       Electronically signed by Zoë Harris RN on 7/21/2025 at 5:41 PM

## 2025-07-21 NOTE — OP NOTE
Operative Note      Patient: Tamie Porter  YOB: 1950  MRN: [id number]    Date of Procedure: 7/21/2025    Pre-Op Diagnosis Codes:      * Old laceration of muscles of pelvic floor [N81.89]     * Prolapse of vaginal walls [N81.10]     * Rectocele [N81.6]     * Female genital prolapse, unspecified type [N81.9]    Post-Op Diagnosis: Same       Procedure(s):  CYSTOSCOPY RIGHT EXTERNAL URETERAL CATHETER INSERTION LAPAROSCOPIC ROBOTIC XI ASSISTED ABDOMINAL SACRAL COLPOPEXY WITH Y MESH  LAPAROSCOPIC ROBOTIC XI ASSISTED SUPRACERVICAL HYSTERECTOMY BILATERAL SALPINGO OOPHORECTOMY POSSIBLE POSTERIOR COLPORRHAPHY POSSIBLE LEVATOR MYORRHAPHY    Surgeon(s):  Wes Donahue DO Quirk, William M, MD    Assistant:   * No surgical staff found *    Anesthesia: General    Estimated Blood Loss (mL): Minimal    Complications: None    Specimens:   * No specimens in log *    Implants:  * No implants in log *      Drains: * No LDAs found *    Findings:  Present At Time Of Surgery (PATOS) (choose all levels that have infection present):  No infection present  Other Findings: the above    Detailed Description of Procedure:   7/21/2025 7:02 AM  Service: Urology  Group: [facility] (Godfrey/Lora/Stormy/Abdiaziz)    Tamie Porter  [id number]     SURGEON:  Wes Donahue DO     PREOPERATIVE DIAGNOSES:    1. cystocele.  2. uterine prolapse.  3. Bladder stone     POSTOPERATIVE DIAGNOSES:    1. cystocele.  2. uterine prolapse.  3. Bladder stone     PROCEDURES:    1. Cystoscopy.  2. Right external ureteral catheter insertion and subsequent removal.  3. Robotic-assisted supracervical hysterectomy performed by Dr. Jacobo Fenton.  4. Robotic-assisted abdominal sacrocolpopexy with a Y-mesh.  5. Bladder stone stone removal      ANESTHESIA:  General anesthesia.       ESTIMATED BLOOD LOSS:  Less than 50 mL.     INTRAOPERATIVE COMPLICATIONS:  None.     PATHOLOGIC SPECIMEN:  Uterus and ovaries.     PREOPERATIVE ANTIBIOTICS:  Administered.     IMPLANTS:  right external ureteral catheter will be removed.  Venegas catheter remained.  Pelvic examination showed complete reduction of the underlying prolapse.  The patient awoke from anesthesia without complication.  Brought to the PACU in a stable condition.     PLAN:    1. Please note, the lap counts, instrument counts, needle counts were correct x2.  2. There were no intraoperative complications.  3. She will be maintained for 12- to 24-hour observation.  4. She will need antibiotics in this period of time.              LISA DONAHUE DO       Electronically signed by Lisa Donahue DO on 7/21/2025 at 7:02 AM

## 2025-07-22 VITALS
BODY MASS INDEX: 26.31 KG/M2 | RESPIRATION RATE: 16 BRPM | TEMPERATURE: 98.4 F | DIASTOLIC BLOOD PRESSURE: 56 MMHG | SYSTOLIC BLOOD PRESSURE: 150 MMHG | HEIGHT: 62 IN | HEART RATE: 71 BPM | OXYGEN SATURATION: 93 % | WEIGHT: 143 LBS

## 2025-07-22 LAB
ERYTHROCYTE [DISTWIDTH] IN BLOOD BY AUTOMATED COUNT: 12.2 % (ref 11.5–15)
ERYTHROCYTE [DISTWIDTH] IN BLOOD BY AUTOMATED COUNT: 14.3 % (ref 11.5–15)
HCT VFR BLD AUTO: 37.5 % (ref 34–48)
HCT VFR BLD AUTO: 37.9 % (ref 34–48)
HGB BLD-MCNC: 12.4 G/DL (ref 11.5–15.5)
HGB BLD-MCNC: 12.5 G/DL (ref 11.5–15.5)
MCH RBC QN AUTO: 29.6 PG (ref 26–35)
MCH RBC QN AUTO: 33.6 PG (ref 26–35)
MCHC RBC AUTO-ENTMCNC: 32.7 G/DL (ref 32–34.5)
MCHC RBC AUTO-ENTMCNC: 33.3 G/DL (ref 32–34.5)
MCV RBC AUTO: 102.7 FL (ref 80–99.9)
MCV RBC AUTO: 88.7 FL (ref 80–99.9)
PLATELET # BLD AUTO: 179 K/UL (ref 130–450)
PLATELET # BLD AUTO: 209 K/UL (ref 130–450)
PMV BLD AUTO: 10.1 FL (ref 7–12)
PMV BLD AUTO: 8.8 FL (ref 7–12)
RBC # BLD AUTO: 3.69 M/UL (ref 3.5–5.5)
RBC # BLD AUTO: 4.23 M/UL (ref 3.5–5.5)
WBC OTHER # BLD: 10.9 K/UL (ref 4.5–11.5)
WBC OTHER # BLD: 11.4 K/UL (ref 4.5–11.5)

## 2025-07-22 PROCEDURE — 51798 US URINE CAPACITY MEASURE: CPT

## 2025-07-22 PROCEDURE — 6360000002 HC RX W HCPCS: Performed by: UROLOGY

## 2025-07-22 PROCEDURE — 85027 COMPLETE CBC AUTOMATED: CPT

## 2025-07-22 PROCEDURE — 2500000003 HC RX 250 WO HCPCS: Performed by: UROLOGY

## 2025-07-22 PROCEDURE — 2580000003 HC RX 258: Performed by: UROLOGY

## 2025-07-22 RX ORDER — ONDANSETRON 4 MG/1
4 TABLET, ORALLY DISINTEGRATING ORAL EVERY 8 HOURS PRN
Status: DISCONTINUED | OUTPATIENT
Start: 2025-07-22 | End: 2025-07-22 | Stop reason: HOSPADM

## 2025-07-22 RX ORDER — ONDANSETRON 4 MG/1
4 TABLET, ORALLY DISINTEGRATING ORAL EVERY 8 HOURS PRN
Qty: 6 TABLET | Refills: 0 | Status: SHIPPED | OUTPATIENT
Start: 2025-07-22

## 2025-07-22 RX ORDER — OXYCODONE AND ACETAMINOPHEN 5; 325 MG/1; MG/1
1 TABLET ORAL EVERY 4 HOURS PRN
Qty: 10 TABLET | Refills: 0 | Status: SHIPPED | OUTPATIENT
Start: 2025-07-22 | End: 2025-07-29

## 2025-07-22 RX ADMIN — WATER 1000 MG: 1 INJECTION INTRAMUSCULAR; INTRAVENOUS; SUBCUTANEOUS at 06:30

## 2025-07-22 RX ADMIN — PROMETHAZINE HYDROCHLORIDE 12.5 MG: 25 INJECTION INTRAMUSCULAR; INTRAVENOUS at 06:10

## 2025-07-22 NOTE — PLAN OF CARE
Problem: Discharge Planning  Goal: Discharge to home or other facility with appropriate resources  7/21/2025 2227 by Mitra Katz RN  Outcome: Progressing  7/21/2025 1357 by Zhanna Harris RN  Outcome: Progressing     Problem: Pain  Goal: Verbalizes/displays adequate comfort level or baseline comfort level  7/21/2025 2227 by Mitra Katz RN  Outcome: Progressing  7/21/2025 1357 by Zhanna Harris RN  Outcome: Progressing     Problem: Safety - Adult  Goal: Free from fall injury  7/21/2025 2227 by Mitra Katz RN  Outcome: Progressing  7/21/2025 1357 by Zhanna Harris RN  Outcome: Progressing     Problem: Skin/Tissue Integrity  Goal: Skin integrity remains intact  Description: 1.  Monitor for areas of redness and/or skin breakdown  2.  Assess vascular access sites hourly  3.  Every 4-6 hours minimum:  Change oxygen saturation probe site  4.  Every 4-6 hours:  If on nasal continuous positive airway pressure, respiratory therapy assess nares and determine need for appliance change or resting period  7/21/2025 2227 by Mitra Katz RN  Outcome: Progressing  7/21/2025 1357 by Zhanna Harris RN  Outcome: Progressing     Problem: ABCDS Injury Assessment  Goal: Absence of physical injury  7/21/2025 2227 by Mitra Katz RN  Outcome: Progressing  7/21/2025 1357 by Zhanna Harris RN  Outcome: Progressing     Problem: Gastrointestinal - Adult  Goal: Minimal or absence of nausea and vomiting  7/21/2025 2227 by Mitra Katz RN  Outcome: Progressing  7/21/2025 1357 by Zhanna Harris RN  Outcome: Progressing  Goal: Maintains or returns to baseline bowel function  7/21/2025 1357 by Zhanna Harris RN  Outcome: Progressing  Goal: Maintains adequate nutritional intake  7/21/2025 1357 by Zhanna Harris RN  Outcome: Progressing  Goal: Establish and maintain optimal ostomy function  7/21/2025 1357 by Zhanna Harris RN  Outcome: Progressing     Problem: Genitourinary - Adult  Goal: Absence of urinary retention  7/21/2025

## 2025-07-22 NOTE — PLAN OF CARE
Problem: Discharge Planning  Goal: Discharge to home or other facility with appropriate resources  7/22/2025 0910 by Kelly Chi RN  Outcome: Adequate for Discharge  7/21/2025 2227 by Mitra Katz RN  Outcome: Progressing     Problem: Pain  Goal: Verbalizes/displays adequate comfort level or baseline comfort level  7/22/2025 0910 by Kelly Chi RN  Outcome: Adequate for Discharge  7/21/2025 2227 by Mitra Katz RN  Outcome: Progressing     Problem: Safety - Adult  Goal: Free from fall injury  7/22/2025 0910 by Kelly Chi RN  Outcome: Adequate for Discharge  7/21/2025 2227 by Mitra Katz RN  Outcome: Progressing     Problem: Skin/Tissue Integrity  Goal: Skin integrity remains intact  Description: 1.  Monitor for areas of redness and/or skin breakdown  2.  Assess vascular access sites hourly  3.  Every 4-6 hours minimum:  Change oxygen saturation probe site  4.  Every 4-6 hours:  If on nasal continuous positive airway pressure, respiratory therapy assess nares and determine need for appliance change or resting period  7/22/2025 0910 by Kelly Chi RN  Outcome: Adequate for Discharge  7/21/2025 2227 by Mitra Katz RN  Outcome: Progressing     Problem: ABCDS Injury Assessment  Goal: Absence of physical injury  7/22/2025 0910 by Kelly Chi RN  Outcome: Adequate for Discharge  7/21/2025 2227 by Mitra Katz RN  Outcome: Progressing     Problem: Gastrointestinal - Adult  Goal: Minimal or absence of nausea and vomiting  7/22/2025 0910 by Kelly Chi RN  Outcome: Adequate for Discharge  7/21/2025 2227 by Mitra Katz RN  Outcome: Progressing  Goal: Maintains or returns to baseline bowel function  7/22/2025 0910 by Kelly Chi RN  Outcome: Adequate for Discharge  Goal: Maintains adequate nutritional intake  7/22/2025 0910 by Kelly Chi RN  Outcome: Adequate for Discharge  Goal: Establish and maintain optimal ostomy function  7/22/2025 0910 by Alon

## 2025-07-22 NOTE — PROGRESS NOTES
7/22/2025 6:46 AM  Service: Urology  Group: [facility] (Godfrey/Stormy Paulino/Abdiaziz)    Tamie Porter  [id number]    Chief urologic compliant: prolapse  HPI:  Patient is doing better  She did have some eye pain  This has improved  She was able to ambulate  She does not have pain  She did have some nausea     Review of Systems:  Respiratory: negative for cough and hemoptysis  Cardiovascular: negative for chest pain and dyspnea  Gastrointestinal: negative for abdominal pain, diarrhea, nausea and vomiting  Derm: negative for rash and skin lesion(s)  Neurological: negative for seizures and tremors  Endocrine: negative for diabetic symptoms including polydipsia and polyuria  : As above in the HPI, otherwise negative  All other reviews are negative     Allergies: Fish-derived products and Indomethacin    Objective:   Vitals:    07/22/25 0400   BP: (!) 140/59   Pulse: 76   Resp: 16   Temp: 97.8 °F (36.6 °C)   SpO2: 93%       Neuro: A/A/O x3  Respiratory: non labored breathing  ABD: soft non-tender, non-distended  : carlson clear  Ext: no clubbing, cyanosis, edema    Labs:   Recent Labs     07/22/25  0428 07/22/25  0507   WBC 10.9 11.4   RBC 4.23 3.69   HGB 12.5 12.4   HCT 37.5 37.9   MCV 88.7 102.7*   MCH 29.6 33.6   MCHC 33.3 32.7   RDW 14.3 12.2    179   MPV 8.8 10.1       No results for input(s): \"CREATININE\" in the last 72 hours.    Assessment: Tamie DONNELLY German 74 y.o. female     POD 1 RA ASCP with Y mesh and RA  for prolapse     Plan:    DC carlson  Ambulate  DC home  Home going instructions were reviewed  Rx's were sent in  FU in the office in 2-3   Restart home meds      Wes A Godfrey, DO   [facility]

## 2025-07-22 NOTE — PROGRESS NOTES
Post-Operative Note    POD#1    S:  Patient without complaints.  Ambulated.   No nausea/no vomiting.  Awaiting voiding trial this am.     O: BP (!) 150/56   Pulse 71   Temp 98.4 °F (36.9 °C) (Oral)   Resp 16   Ht 1.575 m (5' 2\")   Wt 64.9 kg (143 lb)   SpO2 93%   BMI 26.16 kg/m²           ABD:    Soft, non-distended.            Incision dry.           EXT:     No Ck's.         LABS:  Hemoglobin/Hematocrit:    Lab Results   Component Value Date/Time    HGB 12.4 07/22/2025 05:07 AM    HCT 37.9 07/22/2025 05:07 AM          No results found.  IMP:  1.  POD#1, status-post Robotic-assisted combined surgery with ALAN/BSO, ASC, cysto, external ureteral stent and removal            2.    Patient Active Problem List   Diagnosis    Exostosis of right foot    Nerve entrapment syndrome of right foot    Onychomycosis    Pain of right foot    Difficulty walking    Uterine prolapse        Plan: 1.  Routine post-operative care           2.  Discharge to home after voiding trial           3.  Follow-up in office as directed            Electronically signed by Ted Fenton MD on 7/22/2025 at 8:01 AM

## 2025-07-24 LAB — SURGICAL PATHOLOGY REPORT: NORMAL

## 2025-07-25 LAB
STONE COMPOSITION: NORMAL
STONE DESCRIPTION: NORMAL
STONE MASS: 54 MG

## 2025-08-08 RX ORDER — OMEGA-3 FATTY ACIDS/FISH OIL 300-1000MG
2 CAPSULE ORAL EVERY 6 HOURS PRN
Status: ON HOLD | COMMUNITY
End: 2025-08-11 | Stop reason: HOSPADM

## 2025-08-08 RX ORDER — CELECOXIB 200 MG/1
200 CAPSULE ORAL PRN
COMMUNITY

## 2025-08-09 ENCOUNTER — ANESTHESIA EVENT (OUTPATIENT)
Dept: OPERATING ROOM | Age: 75
End: 2025-08-09
Payer: MEDICARE

## 2025-08-11 ENCOUNTER — ANESTHESIA (OUTPATIENT)
Dept: OPERATING ROOM | Age: 75
End: 2025-08-11
Payer: MEDICARE

## 2025-08-11 ENCOUNTER — HOSPITAL ENCOUNTER (OUTPATIENT)
Age: 75
Setting detail: OUTPATIENT SURGERY
Discharge: HOME | End: 2025-08-11
Attending: OBSTETRICS & GYNECOLOGY | Admitting: OBSTETRICS & GYNECOLOGY
Payer: MEDICARE

## 2025-08-11 VITALS
OXYGEN SATURATION: 96 % | TEMPERATURE: 97.3 F | WEIGHT: 129 LBS | HEART RATE: 32 BPM | RESPIRATION RATE: 20 BRPM | SYSTOLIC BLOOD PRESSURE: 155 MMHG | BODY MASS INDEX: 23.74 KG/M2 | HEIGHT: 62 IN | DIASTOLIC BLOOD PRESSURE: 50 MMHG

## 2025-08-11 DIAGNOSIS — G89.18 POST-OP PAIN: Primary | ICD-10-CM

## 2025-08-11 LAB
ANION GAP SERPL CALCULATED.3IONS-SCNC: 13 MMOL/L (ref 7–16)
BUN SERPL-MCNC: 15 MG/DL (ref 8–23)
CALCIUM SERPL-MCNC: 9.4 MG/DL (ref 8.8–10.2)
CHLORIDE SERPL-SCNC: 101 MMOL/L (ref 98–107)
CO2 SERPL-SCNC: 26 MMOL/L (ref 22–29)
CREAT SERPL-MCNC: 0.6 MG/DL (ref 0.5–1)
GFR, ESTIMATED: >90 ML/MIN/1.73M2
GLUCOSE SERPL-MCNC: 92 MG/DL (ref 74–99)
POTASSIUM SERPL-SCNC: 3.7 MMOL/L (ref 3.5–5.1)
SODIUM SERPL-SCNC: 141 MMOL/L (ref 136–145)

## 2025-08-11 PROCEDURE — 2500000003 HC RX 250 WO HCPCS: Performed by: OBSTETRICS & GYNECOLOGY

## 2025-08-11 PROCEDURE — 2709999900 HC NON-CHARGEABLE SUPPLY: Performed by: OBSTETRICS & GYNECOLOGY

## 2025-08-11 PROCEDURE — 3600000012 HC SURGERY LEVEL 2 ADDTL 15MIN: Performed by: OBSTETRICS & GYNECOLOGY

## 2025-08-11 PROCEDURE — 6370000000 HC RX 637 (ALT 250 FOR IP): Performed by: OBSTETRICS & GYNECOLOGY

## 2025-08-11 PROCEDURE — 2580000003 HC RX 258

## 2025-08-11 PROCEDURE — 7100000010 HC PHASE II RECOVERY - FIRST 15 MIN: Performed by: OBSTETRICS & GYNECOLOGY

## 2025-08-11 PROCEDURE — C1771 REP DEV, URINARY, W/SLING: HCPCS | Performed by: OBSTETRICS & GYNECOLOGY

## 2025-08-11 PROCEDURE — 6360000002 HC RX W HCPCS

## 2025-08-11 PROCEDURE — 6360000002 HC RX W HCPCS: Performed by: ANESTHESIOLOGY

## 2025-08-11 PROCEDURE — 6360000002 HC RX W HCPCS: Performed by: OBSTETRICS & GYNECOLOGY

## 2025-08-11 PROCEDURE — 7100000001 HC PACU RECOVERY - ADDTL 15 MIN: Performed by: OBSTETRICS & GYNECOLOGY

## 2025-08-11 PROCEDURE — 3600000002 HC SURGERY LEVEL 2 BASE: Performed by: OBSTETRICS & GYNECOLOGY

## 2025-08-11 PROCEDURE — 3700000000 HC ANESTHESIA ATTENDED CARE: Performed by: OBSTETRICS & GYNECOLOGY

## 2025-08-11 PROCEDURE — 80048 BASIC METABOLIC PNL TOTAL CA: CPT

## 2025-08-11 PROCEDURE — 3700000001 HC ADD 15 MINUTES (ANESTHESIA): Performed by: OBSTETRICS & GYNECOLOGY

## 2025-08-11 PROCEDURE — 7100000011 HC PHASE II RECOVERY - ADDTL 15 MIN: Performed by: OBSTETRICS & GYNECOLOGY

## 2025-08-11 PROCEDURE — 57288 REPAIR BLADDER DEFECT: CPT | Performed by: PHYSICIAN ASSISTANT

## 2025-08-11 PROCEDURE — 7100000000 HC PACU RECOVERY - FIRST 15 MIN: Performed by: OBSTETRICS & GYNECOLOGY

## 2025-08-11 DEVICE — IMPLANTABLE DEVICE: Type: IMPLANTABLE DEVICE | Site: VAGINA | Status: FUNCTIONAL

## 2025-08-11 RX ORDER — SODIUM CHLORIDE, SODIUM LACTATE, POTASSIUM CHLORIDE, CALCIUM CHLORIDE 600; 310; 30; 20 MG/100ML; MG/100ML; MG/100ML; MG/100ML
INJECTION, SOLUTION INTRAVENOUS CONTINUOUS
Status: DISCONTINUED | OUTPATIENT
Start: 2025-08-11 | End: 2025-08-12 | Stop reason: HOSPADM

## 2025-08-11 RX ORDER — CEPHALEXIN 500 MG/1
500 CAPSULE ORAL 2 TIMES DAILY
Qty: 10 CAPSULE | Refills: 0 | Status: SHIPPED | OUTPATIENT
Start: 2025-08-11 | End: 2025-08-16

## 2025-08-11 RX ORDER — SODIUM CHLORIDE 0.9 % (FLUSH) 0.9 %
5-40 SYRINGE (ML) INJECTION PRN
Status: DISCONTINUED | OUTPATIENT
Start: 2025-08-11 | End: 2025-08-12 | Stop reason: HOSPADM

## 2025-08-11 RX ORDER — ONDANSETRON 2 MG/ML
INJECTION INTRAMUSCULAR; INTRAVENOUS
Status: DISCONTINUED | OUTPATIENT
Start: 2025-08-11 | End: 2025-08-11 | Stop reason: SDUPTHER

## 2025-08-11 RX ORDER — ONDANSETRON 4 MG/1
4 TABLET, ORALLY DISINTEGRATING ORAL EVERY 8 HOURS PRN
Qty: 6 TABLET | Refills: 0 | Status: SHIPPED | OUTPATIENT
Start: 2025-08-11

## 2025-08-11 RX ORDER — PROCHLORPERAZINE EDISYLATE 5 MG/ML
5 INJECTION INTRAMUSCULAR; INTRAVENOUS
Status: COMPLETED | OUTPATIENT
Start: 2025-08-11 | End: 2025-08-11

## 2025-08-11 RX ORDER — PHENYLEPHRINE HCL IN 0.9% NACL 1 MG/10 ML
SYRINGE (ML) INTRAVENOUS
Status: DISCONTINUED | OUTPATIENT
Start: 2025-08-11 | End: 2025-08-11 | Stop reason: SDUPTHER

## 2025-08-11 RX ORDER — SODIUM CHLORIDE 0.9 % (FLUSH) 0.9 %
5-40 SYRINGE (ML) INJECTION EVERY 12 HOURS SCHEDULED
Status: DISCONTINUED | OUTPATIENT
Start: 2025-08-11 | End: 2025-08-12 | Stop reason: HOSPADM

## 2025-08-11 RX ORDER — IBUPROFEN 800 MG/1
400 TABLET, FILM COATED ORAL
Status: COMPLETED | OUTPATIENT
Start: 2025-08-11 | End: 2025-08-11

## 2025-08-11 RX ORDER — IBUPROFEN 400 MG/1
400 TABLET, FILM COATED ORAL EVERY 6 HOURS PRN
Qty: 12 TABLET | Refills: 0 | Status: SHIPPED | OUTPATIENT
Start: 2025-08-11

## 2025-08-11 RX ORDER — SODIUM CHLORIDE 9 MG/ML
INJECTION, SOLUTION INTRAVENOUS PRN
Status: DISCONTINUED | OUTPATIENT
Start: 2025-08-11 | End: 2025-08-12 | Stop reason: HOSPADM

## 2025-08-11 RX ORDER — BUPIVACAINE HYDROCHLORIDE AND EPINEPHRINE 5; 5 MG/ML; UG/ML
INJECTION, SOLUTION EPIDURAL; INTRACAUDAL; PERINEURAL PRN
Status: DISCONTINUED | OUTPATIENT
Start: 2025-08-11 | End: 2025-08-11 | Stop reason: ALTCHOICE

## 2025-08-11 RX ORDER — SODIUM CHLORIDE 9 MG/ML
INJECTION, SOLUTION INTRAVENOUS
Status: DISCONTINUED | OUTPATIENT
Start: 2025-08-11 | End: 2025-08-11 | Stop reason: SDUPTHER

## 2025-08-11 RX ORDER — BUPIVACAINE HYDROCHLORIDE 7.5 MG/ML
INJECTION, SOLUTION EPIDURAL; RETROBULBAR
Status: COMPLETED | OUTPATIENT
Start: 2025-08-11 | End: 2025-08-11

## 2025-08-11 RX ORDER — HYDROCODONE BITARTRATE AND ACETAMINOPHEN 5; 325 MG/1; MG/1
1 TABLET ORAL EVERY 6 HOURS PRN
Qty: 5 TABLET | Refills: 0 | Status: SHIPPED | OUTPATIENT
Start: 2025-08-11 | End: 2025-08-14

## 2025-08-11 RX ADMIN — BUPIVACAINE HYDROCHLORIDE 9 MG: 7.5 INJECTION, SOLUTION EPIDURAL; RETROBULBAR at 12:30

## 2025-08-11 RX ADMIN — SODIUM CHLORIDE: 9 INJECTION, SOLUTION INTRAVENOUS at 12:28

## 2025-08-11 RX ADMIN — Medication 50 MCG: at 13:08

## 2025-08-11 RX ADMIN — Medication 50 MCG: at 12:50

## 2025-08-11 RX ADMIN — ONDANSETRON 4 MG: 2 INJECTION, SOLUTION INTRAMUSCULAR; INTRAVENOUS at 12:46

## 2025-08-11 RX ADMIN — IBUPROFEN 400 MG: 400 TABLET, FILM COATED ORAL at 17:24

## 2025-08-11 RX ADMIN — PROCHLORPERAZINE EDISYLATE 5 MG: 5 INJECTION INTRAMUSCULAR; INTRAVENOUS at 13:51

## 2025-08-11 RX ADMIN — Medication 50 MCG: at 13:15

## 2025-08-11 RX ADMIN — WATER 2000 MG: 1 INJECTION INTRAMUSCULAR; INTRAVENOUS; SUBCUTANEOUS at 12:43

## 2025-08-11 ASSESSMENT — PAIN DESCRIPTION - DESCRIPTORS
DESCRIPTORS: DISCOMFORT
DESCRIPTORS: DISCOMFORT

## 2025-08-11 ASSESSMENT — PAIN - FUNCTIONAL ASSESSMENT
PAIN_FUNCTIONAL_ASSESSMENT: 0-10
PAIN_FUNCTIONAL_ASSESSMENT: PREVENTS OR INTERFERES SOME ACTIVE ACTIVITIES AND ADLS
PAIN_FUNCTIONAL_ASSESSMENT: 0-10

## 2025-08-11 ASSESSMENT — PAIN DESCRIPTION - FREQUENCY: FREQUENCY: CONTINUOUS

## 2025-08-11 ASSESSMENT — PAIN SCALES - GENERAL
PAINLEVEL_OUTOF10: 0
PAINLEVEL_OUTOF10: 5
PAINLEVEL_OUTOF10: 0
PAINLEVEL_OUTOF10: 4
PAINLEVEL_OUTOF10: 0

## 2025-08-11 ASSESSMENT — PAIN DESCRIPTION - ORIENTATION: ORIENTATION: LOWER

## 2025-08-11 ASSESSMENT — PAIN DESCRIPTION - LOCATION
LOCATION: ABDOMEN
LOCATION: ABDOMEN

## 2025-08-11 ASSESSMENT — PAIN DESCRIPTION - ONSET: ONSET: ON-GOING

## 2025-08-11 ASSESSMENT — PAIN DESCRIPTION - PAIN TYPE: TYPE: SURGICAL PAIN

## 2025-08-11 ASSESSMENT — PAIN DESCRIPTION - DIRECTION: RADIATING_TOWARDS: INCISIONAL

## (undated) DEVICE — BAG URIN DRNGE 2000ML SLDE TAP LUERLOCK PRT ANTI REFLX TWR

## (undated) DEVICE — GOWN,SIRUS,POLYRNF,BRTHSLV,XLN/XL,20/CS: Brand: MEDLINE

## (undated) DEVICE — SET IRRIG L81IN DIA0.195IN UROLOGY Y TYP 2 LD W/ DRIP CHMBR

## (undated) DEVICE — SUCTION IRRIGATOR: Brand: ENDOWRIST

## (undated) DEVICE — MANIFOLD SUCT 4 PRT 2 CANSTR FLTR DISP NEPTUNE 2

## (undated) DEVICE — WARMER SCP 2 ANTIFOG LAP DISP

## (undated) DEVICE — Device

## (undated) DEVICE — CATHETER F BLLN 5CC 14FR 2 W HYDRGEL COAT LESS TRAUM LUB

## (undated) DEVICE — MONOPOLAR CURVED SCISSORS: Brand: ENDOWRIST

## (undated) DEVICE — ARM DRAPE

## (undated) DEVICE — MEGA SUTURECUT ND: Brand: ENDOWRIST

## (undated) DEVICE — SOLUTION IRRIG 1000ML 09% SOD CHL USP PIC PLAS CONTAINER

## (undated) DEVICE — PAD SANIT L11IN MAXI POLYMER HVY ABSRB ADH CNTOUR W/O TAIL

## (undated) DEVICE — CATHETER F BLLN 30CC 16FR 2 W HYDRGEL COAT LESS TRAUM LUB

## (undated) DEVICE — SOLUTION IRRIG 1000ML H2O PIC PLAS SHATTERPROOF CONTAINER

## (undated) DEVICE — PACK,AURORA,LAVH: Brand: MEDLINE

## (undated) DEVICE — GLOVE SURG SZ 75 L12IN FNGR THK79MIL GRN LTX FREE

## (undated) DEVICE — DRAPE SURG W31XL48IN POLYPR STD CUF L6IN SMS LEG PR SFT

## (undated) DEVICE — GUIDEWIRE UROLOGICAL STR STD 0.035 IN X150 CM (QTY = BOX OF 5)

## (undated) DEVICE — SEAL

## (undated) DEVICE — LIQUIBAND RAPID ADHESIVE 36/CS 0.8ML: Brand: MEDLINE

## (undated) DEVICE — PAD,SANITARY,11 IN,MAXI,N-STRL,IND WRAP: Brand: MEDLINE

## (undated) DEVICE — SYRINGE MED 10ML TRNSLUC BRL PLUNG BLK MRK POLYPR CTRL

## (undated) DEVICE — DOUBLE BASIN SET: Brand: MEDLINE INDUSTRIES, INC.

## (undated) DEVICE — AIRSEAL 12 MM ACCESS PORT AND PALM GRIP OBTURATOR WITH BLADELESS OPTICAL TIP, 150 MM LENGTH: Brand: AIRSEAL

## (undated) DEVICE — CATHETER URET 5FR L70CM OPN END SGL LUMN INJ HUB FLEXIMA

## (undated) DEVICE — 1LYRTR 16FR10ML100%SIL UMS SNP: Brand: MEDLINE INDUSTRIES, INC.

## (undated) DEVICE — DECANTER BG FOR ASEP TRNSF OF FLUIDS FR FLX CONT

## (undated) DEVICE — TIP COVER ACCESSORY

## (undated) DEVICE — ZAMI/KRONNER 4.5, UTERINE MANIPULATOR/INJECTOR, 12/BX: Brand: MARINA MEDICAL

## (undated) DEVICE — BLADE,STAINLESS-STEEL,11,STRL,DISPOSABLE: Brand: MEDLINE

## (undated) DEVICE — VESSEL SEALER EXTEND: Brand: ENDOWRIST

## (undated) DEVICE — SOLUTION IRRIG 2000ML 0.9% SOD CHL USP UROMATIC PLAS CONT

## (undated) DEVICE — TIP-UP FENESTRATED GRASPER: Brand: ENDOWRIST

## (undated) DEVICE — DRAPE,TOP,102X53,STERILE: Brand: MEDLINE

## (undated) DEVICE — GLOVE SURG SZ 7 CRM LTX FREE POLYISOPRENE POLYMER BEAD ANTI

## (undated) DEVICE — KIT,ANTI FOG,W/SPONGE & FLUID,SOFT PACK: Brand: MEDLINE

## (undated) DEVICE — NEEDLE SPNL 22GA L3.5IN BLK HUB S STL REG WALL FIT STYL

## (undated) DEVICE — GLOVE ORANGE PI 7   MSG9070

## (undated) DEVICE — TOWEL,OR,DSP,ST,BLUE,STD,6/PK,12PK/CS: Brand: MEDLINE

## (undated) DEVICE — Y-TYPE TUR/BLADDER IRRIGATION SET, REGULATING CLAMP

## (undated) DEVICE — INSTRUMENT CLAMP TOWEL LARGE REUSABLE

## (undated) DEVICE — SYRINGE 20ML LL S/C 50

## (undated) DEVICE — JELLY LUB 2OZ FLIP TOP DISP

## (undated) DEVICE — INSUFFLATION NEEDLE TO ESTABLISH PNEUMOPERITONEUM.: Brand: INSUFFLATION NEEDLE

## (undated) DEVICE — 3M™ STERI-DRAPE™ INCISE DRAPE 1050 (60CM X 45CM): Brand: STERI-DRAPE™

## (undated) DEVICE — MARYLAND BIPOLAR FORCEPS: Brand: ENDOWRIST

## (undated) DEVICE — VAGINAL PREP TRAY: Brand: MEDLINE INDUSTRIES, INC.

## (undated) DEVICE — CONNECTOR CATH URET SIDE PRT FOR FRIC CONN UCA595] GU TEK]

## (undated) DEVICE — GLOVE SURG SZ 75 CRM LTX FREE POLYISOPRENE POLYMER BEAD ANTI

## (undated) DEVICE — ANCHOR TISSUE RETRIEVAL SYSTEM, BAG SIZE 175 ML, PORT SIZE 10 MM: Brand: ANCHOR TISSUE RETRIEVAL SYSTEM

## (undated) DEVICE — SET FLD COLL CLR W/ BLT IN WARN SYS FOR HYSTSCP DOLPHIN

## (undated) DEVICE — LARGE NEEDLE DRIVER: Brand: ENDOWRIST

## (undated) DEVICE — BLADE SURG 11 SS STRL CISION LF DISP

## (undated) DEVICE — DECANTER: Brand: UNBRANDED

## (undated) DEVICE — GLOVE ORANGE PI 7 1/2   MSG9075

## (undated) DEVICE — SOLUTION IV 1000 ML 0.9 NACL INJ USP EXCEL PLAS CONTAINER

## (undated) DEVICE — SYSTEM POS SZ 36 X 20 X 1 IN INTEGR ADJ ARM PROTECTOR LIFT

## (undated) DEVICE — GOWN SURGICAL XL-XLONG L52IN BLUE POLY REINFORCED BREATHABLE FILM SLEEVE

## (undated) DEVICE — COLUMN DRAPE

## (undated) DEVICE — SOLUTION IRRIG 1000ML STRL H2O USP PLAS POUR BTL

## (undated) DEVICE — GOWN SURG XL L47IN BLU FABRICREINFORCED SET IN SL HK LOOP

## (undated) DEVICE — MARKER SURG SKIN GENTIAN VLT REG TIP W/ 6IN RUL AND BLNK DYNJSM02

## (undated) DEVICE — GLOVE SURG SZ 65 THK91MIL LTX FREE SYN POLYISOPRENE

## (undated) DEVICE — DRAPE SURG W53XL77IN STD SMS POLYPR 3 QTR ABSRB REINF W/O

## (undated) DEVICE — NEEDLE HYPO 25GA L1.5IN BLU POLYPR HUB S STL REG BVL STR

## (undated) DEVICE — APPLICATOR MEDICATED 26 CC SOLUTION HI LT ORNG CHLORAPREP

## (undated) DEVICE — BLADELESS OBTURATOR: Brand: WECK VISTA

## (undated) DEVICE — ELECTRODE PT RET AD L9FT HI MOIST COND ADH HYDRGEL CORDED

## (undated) DEVICE — TRI-LUMEN FILTERED TUBE SET WITH ACTIVATED CHARCOAL FILTER: Brand: AIRSEAL

## (undated) DEVICE — 4-PORT MANIFOLD: Brand: NEPTUNE 2

## (undated) DEVICE — PREP TRAY VAG VLY PVP

## (undated) DEVICE — BLADE SURG 10 SS STRL CISION LF DISP